# Patient Record
Sex: FEMALE | Race: WHITE | NOT HISPANIC OR LATINO
[De-identification: names, ages, dates, MRNs, and addresses within clinical notes are randomized per-mention and may not be internally consistent; named-entity substitution may affect disease eponyms.]

---

## 2017-06-14 ENCOUNTER — TRANSCRIPTION ENCOUNTER (OUTPATIENT)
Age: 57
End: 2017-06-14

## 2017-07-26 ENCOUNTER — APPOINTMENT (OUTPATIENT)
Dept: ORTHOPEDIC SURGERY | Facility: CLINIC | Age: 57
End: 2017-07-26

## 2017-07-26 VITALS
HEART RATE: 75 BPM | DIASTOLIC BLOOD PRESSURE: 79 MMHG | HEIGHT: 69 IN | WEIGHT: 175 LBS | SYSTOLIC BLOOD PRESSURE: 121 MMHG | BODY MASS INDEX: 25.92 KG/M2 | TEMPERATURE: 98.6 F

## 2017-08-01 ENCOUNTER — EMERGENCY (EMERGENCY)
Facility: HOSPITAL | Age: 57
LOS: 1 days | End: 2017-08-01
Payer: COMMERCIAL

## 2017-08-01 PROCEDURE — 99283 EMERGENCY DEPT VISIT LOW MDM: CPT

## 2017-08-31 ENCOUNTER — OUTPATIENT (OUTPATIENT)
Dept: OUTPATIENT SERVICES | Facility: HOSPITAL | Age: 57
LOS: 1 days | End: 2017-08-31
Payer: COMMERCIAL

## 2017-08-31 ENCOUNTER — APPOINTMENT (OUTPATIENT)
Dept: MAMMOGRAPHY | Facility: CLINIC | Age: 57
End: 2017-08-31
Payer: COMMERCIAL

## 2017-08-31 ENCOUNTER — APPOINTMENT (OUTPATIENT)
Dept: RADIOLOGY | Facility: CLINIC | Age: 57
End: 2017-08-31
Payer: COMMERCIAL

## 2017-08-31 DIAGNOSIS — Z00.00 ENCOUNTER FOR GENERAL ADULT MEDICAL EXAMINATION WITHOUT ABNORMAL FINDINGS: ICD-10-CM

## 2017-08-31 PROCEDURE — 77063 BREAST TOMOSYNTHESIS BI: CPT

## 2017-08-31 PROCEDURE — G0202: CPT | Mod: 26

## 2017-08-31 PROCEDURE — 77080 DXA BONE DENSITY AXIAL: CPT

## 2017-08-31 PROCEDURE — 77080 DXA BONE DENSITY AXIAL: CPT | Mod: 26

## 2017-08-31 PROCEDURE — 77067 SCR MAMMO BI INCL CAD: CPT

## 2017-08-31 PROCEDURE — 77063 BREAST TOMOSYNTHESIS BI: CPT | Mod: 26

## 2017-09-07 DIAGNOSIS — M85.89 OTHER SPECIFIED DISORDERS OF BONE DENSITY AND STRUCTURE, MULTIPLE SITES: ICD-10-CM

## 2017-09-07 DIAGNOSIS — Z12.31 ENCOUNTER FOR SCREENING MAMMOGRAM FOR MALIGNANT NEOPLASM OF BREAST: ICD-10-CM

## 2017-11-17 ENCOUNTER — APPOINTMENT (OUTPATIENT)
Dept: ULTRASOUND IMAGING | Facility: CLINIC | Age: 57
End: 2017-11-17

## 2018-06-27 ENCOUNTER — APPOINTMENT (OUTPATIENT)
Dept: ORTHOPEDIC SURGERY | Facility: CLINIC | Age: 58
End: 2018-06-27
Payer: COMMERCIAL

## 2018-06-27 PROCEDURE — 99214 OFFICE O/P EST MOD 30 MIN: CPT

## 2018-06-27 PROCEDURE — 73562 X-RAY EXAM OF KNEE 3: CPT | Mod: LT

## 2018-09-12 ENCOUNTER — APPOINTMENT (OUTPATIENT)
Dept: ORTHOPEDIC SURGERY | Facility: CLINIC | Age: 58
End: 2018-09-12
Payer: COMMERCIAL

## 2018-09-12 DIAGNOSIS — M17.12 UNILATERAL PRIMARY OSTEOARTHRITIS, LEFT KNEE: ICD-10-CM

## 2018-09-12 PROCEDURE — 99213 OFFICE O/P EST LOW 20 MIN: CPT

## 2018-12-03 ENCOUNTER — APPOINTMENT (OUTPATIENT)
Dept: ORTHOPEDIC SURGERY | Facility: HOSPITAL | Age: 58
End: 2018-12-03

## 2019-01-19 ENCOUNTER — TRANSCRIPTION ENCOUNTER (OUTPATIENT)
Age: 59
End: 2019-01-19

## 2019-02-20 ENCOUNTER — OTHER (OUTPATIENT)
Age: 59
End: 2019-02-20

## 2019-02-22 ENCOUNTER — RECORD ABSTRACTING (OUTPATIENT)
Age: 59
End: 2019-02-22

## 2019-02-27 ENCOUNTER — APPOINTMENT (OUTPATIENT)
Dept: CARDIOLOGY | Facility: CLINIC | Age: 59
End: 2019-02-27
Payer: COMMERCIAL

## 2019-02-27 ENCOUNTER — NON-APPOINTMENT (OUTPATIENT)
Age: 59
End: 2019-02-27

## 2019-02-27 VITALS
BODY MASS INDEX: 30.51 KG/M2 | DIASTOLIC BLOOD PRESSURE: 84 MMHG | HEART RATE: 72 BPM | OXYGEN SATURATION: 100 % | WEIGHT: 206 LBS | HEIGHT: 69 IN | SYSTOLIC BLOOD PRESSURE: 120 MMHG

## 2019-02-27 DIAGNOSIS — Z84.1 FAMILY HISTORY OF DISORDERS OF KIDNEY AND URETER: ICD-10-CM

## 2019-02-27 DIAGNOSIS — Z82.49 FAMILY HISTORY OF ISCHEMIC HEART DISEASE AND OTHER DISEASES OF THE CIRCULATORY SYSTEM: ICD-10-CM

## 2019-02-27 DIAGNOSIS — Z83.3 FAMILY HISTORY OF DIABETES MELLITUS: ICD-10-CM

## 2019-02-27 DIAGNOSIS — Z86.39 PERSONAL HISTORY OF OTHER ENDOCRINE, NUTRITIONAL AND METABOLIC DISEASE: ICD-10-CM

## 2019-02-27 PROCEDURE — 99244 OFF/OP CNSLTJ NEW/EST MOD 40: CPT

## 2019-02-27 PROCEDURE — 93000 ELECTROCARDIOGRAM COMPLETE: CPT

## 2019-02-27 RX ORDER — CELECOXIB 200 MG/1
200 CAPSULE ORAL
Qty: 60 | Refills: 0 | Status: DISCONTINUED | COMMUNITY
Start: 2018-09-12 | End: 2019-02-27

## 2019-02-27 RX ORDER — ACETAMINOPHEN AND CODEINE 300; 30 MG/1; MG/1
300-30 TABLET ORAL
Qty: 8 | Refills: 0 | Status: DISCONTINUED | COMMUNITY
Start: 2018-01-30 | End: 2019-02-27

## 2019-02-27 RX ORDER — AMOXICILLIN 500 MG/1
500 CAPSULE ORAL
Qty: 21 | Refills: 0 | Status: DISCONTINUED | COMMUNITY
Start: 2018-01-29 | End: 2019-02-27

## 2019-02-27 RX ORDER — AMOXICILLIN AND CLAVULANATE POTASSIUM 875; 125 MG/1; MG/1
875-125 TABLET, COATED ORAL
Qty: 20 | Refills: 0 | Status: DISCONTINUED | COMMUNITY
Start: 2018-01-04 | End: 2019-02-27

## 2019-02-27 RX ORDER — CHLORHEXIDINE GLUCONATE, 0.12% ORAL RINSE 1.2 MG/ML
0.12 SOLUTION DENTAL
Qty: 473 | Refills: 0 | Status: DISCONTINUED | COMMUNITY
Start: 2018-01-29 | End: 2019-02-27

## 2019-02-27 RX ORDER — DICLOFENAC SODIUM 75 MG/1
75 TABLET, DELAYED RELEASE ORAL
Qty: 30 | Refills: 0 | Status: DISCONTINUED | COMMUNITY
Start: 2018-06-27 | End: 2019-02-27

## 2019-02-27 RX ORDER — TOPIRAMATE 25 MG/1
25 CAPSULE, EXTENDED RELEASE ORAL
Qty: 30 | Refills: 0 | Status: DISCONTINUED | COMMUNITY
Start: 2018-01-31 | End: 2019-02-27

## 2019-02-27 RX ORDER — AMOXICILLIN 875 MG/1
875 TABLET, FILM COATED ORAL
Qty: 20 | Refills: 0 | Status: DISCONTINUED | COMMUNITY
Start: 2018-06-01 | End: 2019-02-27

## 2019-02-27 RX ORDER — TRAMADOL HYDROCHLORIDE 50 MG/1
50 TABLET, COATED ORAL 3 TIMES DAILY
Qty: 30 | Refills: 0 | Status: DISCONTINUED | COMMUNITY
Start: 2018-09-12 | End: 2019-02-27

## 2019-02-27 RX ORDER — TRAMADOL HYDROCHLORIDE 50 MG/1
50 TABLET, COATED ORAL
Qty: 60 | Refills: 0 | Status: DISCONTINUED | COMMUNITY
Start: 2018-10-08 | End: 2019-02-27

## 2019-02-27 NOTE — ASSESSMENT
[FreeTextEntry1] : EKG Elana February 27, 2019. Normal sinus rhythm. RS R. Dash. Infero-anterolateral ST-T wave changes nonspecific. Rightward axis.\par Labs her request

## 2019-02-27 NOTE — REASON FOR VISIT
[Consultation] : a consultation regarding [Chest Pain] : chest pain [Dyspnea] : dyspnea [Fatigue] : feeling tired (fatigue) [Palpitations] : palpitations

## 2019-02-27 NOTE — HISTORY OF PRESENT ILLNESS
[FreeTextEntry1] : 58-year-old is referred for consultation and presence of\par Bilateral ankle edema, treated with hydrochlorothiazide in association with exertional dyspnea, intermittent palpitations in the form of a red about her heart beats without any associated syncope, uneasy feeling in the chest without any radiation of pain, jaw pain, or diaphoresis.\par There is no significant PND, orthopnea.\par She is being treated for generalized inflammation.\par She has no history of hypertension, diabetes mellitus, myocardial infarction, cerebrovascular accident, periparotid disease\par Her CRP level was significantly elevated. I do not have lipid panel

## 2019-02-27 NOTE — DISCUSSION/SUMMARY
[FreeTextEntry1] : 58-year-old female comes in with intermittent ankle edema, dyspnea on exertion, nonexertional, chest pain, significantly elevated CRP level, abnormal EKG, and presence of treatment for generalized inflammation.\par Recommendation at present includes\par Obtain labs, which includes lipid panel.\par Echocardiogram for ejection fraction wall motion pericardial space and valvular evaluations, but\par Stress myocardial perfusion scan probably with lexiscan/pharmacologic in presence of significant arthritis and arthralgia, atypical chest pain, exertional shortness of breath, and abnormal EKG.\par \par Consider a sleep apnea evaluation.\par Further long-term risk stratification. Based on above tests and lipid panel\par \par Counseling regarding low saturated fat, salt and carbohydrate intake was reviewed. Active lifestyle and regular. Exercise along with weight management is advised.\par All the above were at length reviewed. Answered all the questions. Thank you very much for this kind referral. Please do not hesitate to give me a call for any question.\par Part of this transcription was done with voice recognition software and phonetically similar errors are common. I apologize for that. Please donot hesitate to call for any questions due to above.\par \par Follow up after above tests\par \par

## 2019-02-27 NOTE — REVIEW OF SYSTEMS
[Feeling Fatigued] : feeling fatigued [see HPI] : see HPI [Joint Pain] : joint pain [Joint Swelling] : joint swelling [Joint Stiffness] : joint stiffness [Negative] : Heme/Lymph [Fever] : no fever [Headache] : no headache [Recent Weight Gain (___ Lbs)] : no recent weight gain [Recent Weight Loss (___ Lbs)] : no recent weight loss [FreeTextEntry1] : snoring

## 2019-02-27 NOTE — PHYSICAL EXAM
[General Appearance - Well Developed] : well developed [Normal Appearance] : normal appearance [Well Groomed] : well groomed [General Appearance - Well Nourished] : well nourished [No Deformities] : no deformities [General Appearance - In No Acute Distress] : no acute distress [Normal Conjunctiva] : the conjunctiva exhibited no abnormalities [Eyelids - No Xanthelasma] : the eyelids demonstrated no xanthelasmas [Normal Oral Mucosa] : normal oral mucosa [No Oral Pallor] : no oral pallor [No Oral Cyanosis] : no oral cyanosis [Normal Jugular Venous A Waves Present] : normal jugular venous A waves present [Normal Jugular Venous V Waves Present] : normal jugular venous V waves present [No Jugular Venous Marin A Waves] : no jugular venous marin A waves [Normal] : normal [No Precordial Heave] : no precordial heave was noted [Normal Rate] : normal [Normal S1] : normal S1 [Normal S2] : normal S2 [II] : a grade 2 [2+] : left 2+ [No Abnormalities] : the abdominal aorta was not enlarged and no bruit was heard [No Pitting Edema] : no pitting edema present [Respiration, Rhythm And Depth] : normal respiratory rhythm and effort [Exaggerated Use Of Accessory Muscles For Inspiration] : no accessory muscle use [Auscultation Breath Sounds / Voice Sounds] : lungs were clear to auscultation bilaterally [Abdomen Soft] : soft [Abdomen Tenderness] : non-tender [Abdomen Mass (___ Cm)] : no abdominal mass palpated [Abnormal Walk] : normal gait [Gait - Sufficient For Exercise Testing] : the gait was sufficient for exercise testing [Nail Clubbing] : no clubbing of the fingernails [Cyanosis, Localized] : no localized cyanosis [Petechial Hemorrhages (___cm)] : no petechial hemorrhages [Skin Color & Pigmentation] : normal skin color and pigmentation [] : no rash [No Venous Stasis] : no venous stasis [Skin Lesions] : no skin lesions [No Skin Ulcers] : no skin ulcer [No Xanthoma] : no  xanthoma was observed [Oriented To Time, Place, And Person] : oriented to person, place, and time [Affect] : the affect was normal [Mood] : the mood was normal [No Anxiety] : not feeling anxious [S3] : no S3 [S4] : no S4 [Right Carotid Bruit] : no bruit heard over the right carotid [Left Carotid Bruit] : no bruit heard over the left carotid [Right Femoral Bruit] : no bruit heard over the right femoral artery [Left Femoral Bruit] : no bruit heard over the left femoral artery

## 2019-03-03 ENCOUNTER — FORM ENCOUNTER (OUTPATIENT)
Age: 59
End: 2019-03-03

## 2019-03-18 ENCOUNTER — APPOINTMENT (OUTPATIENT)
Dept: ORTHOPEDIC SURGERY | Facility: HOSPITAL | Age: 59
End: 2019-03-18

## 2019-03-25 ENCOUNTER — APPOINTMENT (OUTPATIENT)
Dept: CARDIOLOGY | Facility: CLINIC | Age: 59
End: 2019-03-25
Payer: COMMERCIAL

## 2019-03-25 PROCEDURE — 93015 CV STRESS TEST SUPVJ I&R: CPT

## 2019-03-25 PROCEDURE — A9502: CPT

## 2019-03-25 PROCEDURE — 93306 TTE W/DOPPLER COMPLETE: CPT

## 2019-03-25 PROCEDURE — 78452 HT MUSCLE IMAGE SPECT MULT: CPT

## 2019-03-26 ENCOUNTER — RECORD ABSTRACTING (OUTPATIENT)
Age: 59
End: 2019-03-26

## 2019-04-01 ENCOUNTER — APPOINTMENT (OUTPATIENT)
Dept: CARDIOLOGY | Facility: CLINIC | Age: 59
End: 2019-04-01
Payer: COMMERCIAL

## 2019-04-01 VITALS
SYSTOLIC BLOOD PRESSURE: 92 MMHG | BODY MASS INDEX: 28.88 KG/M2 | DIASTOLIC BLOOD PRESSURE: 68 MMHG | WEIGHT: 195 LBS | HEART RATE: 78 BPM | OXYGEN SATURATION: 99 % | HEIGHT: 69 IN

## 2019-04-01 PROCEDURE — 99214 OFFICE O/P EST MOD 30 MIN: CPT

## 2019-04-01 RX ORDER — HYDROCHLOROTHIAZIDE 25 MG/1
25 TABLET ORAL DAILY
Qty: 90 | Refills: 1 | Status: DISCONTINUED | COMMUNITY
End: 2019-04-01

## 2019-04-01 NOTE — ASSESSMENT
[FreeTextEntry1] : EKG Elana February 27, 2019. Normal sinus rhythm. . Infero-anterolateral ST-T wave changes nonspecific. Rightward axis.\par \par Reviewed on April 1, 2019\par Echocardiogram. Preserved ejection fraction. Possible PFO/ASD.\par Myocardial perfusion scan pharmacological. Nonischemic with preserved ejection fraction\par

## 2019-04-01 NOTE — REASON FOR VISIT
[Consultation] : a consultation regarding [Chest Pain] : chest pain [Dyspnea] : dyspnea [Fatigue] : feeling tired (fatigue) [Palpitations] : palpitations [FreeTextEntry1] : 58-year-old comes in for followup consultation due to review her echocardiogram, nuclear myocardial perfusion scan.\par Her edema has remained stable.\par She has increased fatigue with lower blood pressure.\par She has no chest pain, PND, orthopnea, palpitation, or syncopal episode.\par

## 2019-04-01 NOTE — DISCUSSION/SUMMARY
[FreeTextEntry1] : 58-year-old female comes in with intermittent ankle edema, dyspnea on exertion, nonexertional, chest pain, significantly elevated CRP level, abnormal EKG, and presence of treatment for generalized inflammation.\par Now with also lower blood pressure and increased fatigue on the hydrochlorothiazide.\par Medical his veins.\par Echocardiogram and myocardial perfusion scan showing preserved LV systolic function, nonischemic perfusion scan, possible PFO/ASD.\par Findings reviewed with her at length.\par Good prognostic information discussed.\par Limitation of the cardia vascular tests reviewed.\par A. persistent or worsening symptoms in future she may require further invasive evaluation and management.\par PFO reviewed with her. No history of neurological or cardioembolic events.\par Discuss with her regarding the association with cardioembolic event specifically neurological events with PFO.\par Discussed with her regarding edema in relation to varicose veins/venous insufficiency rather than cardiovascular etiology.\par Discuss with her regarding possible hydrochlorothiazide, making lower blood pressure, worse with increasing fatigue and tiredness.\par \par At present I recommended her to\par Lipid panel for risk stratification.\par Saline bubble study to evaluate for PFO/ASD. If evidence of PFO. Consider aspirin 81 mg. If no evidence of PFO/ASD. No need to pursue any further evaluation unless change in clinical status\par Low salt diet increase activity and compression stockings as needed.\par Discontinue hydrochlorothiazide for now.\par Consider vascular evaluation inflammation and referral given.\par Consider a sleep apnea evaluation as recommended into previous evaluation\par \par Counseling regarding low saturated fat, salt and carbohydrate intake was reviewed. Active lifestyle and regular. Exercise along with weight management is advised.\par All the above were at length reviewed. Answered all the questions. Thank you very much for this kind referral. Please do not hesitate to give me a call for any question.\par Part of this transcription was done with voice recognition software and phonetically similar errors are common. I apologize for that. Please donot hesitate to call for any questions due to above.\par \par

## 2019-04-01 NOTE — REVIEW OF SYSTEMS
[Fever] : no fever [Headache] : no headache [Recent Weight Gain (___ Lbs)] : no recent weight gain [Feeling Fatigued] : feeling fatigued [Recent Weight Loss (___ Lbs)] : no recent weight loss [see HPI] : see HPI [Joint Pain] : joint pain [Joint Swelling] : joint swelling [Joint Stiffness] : joint stiffness [Negative] : Heme/Lymph [FreeTextEntry1] : snoring

## 2019-04-01 NOTE — CARDIOLOGY SUMMARY
[No Ischemia] : no Ischemia [___] : [unfilled] [LVEF ___%] : LVEF [unfilled]% [None] : no pulmonary hypertension [Normal] : normal LA size [Mild] : mild mitral regurgitation

## 2019-04-01 NOTE — HISTORY OF PRESENT ILLNESS
[FreeTextEntry1] : \par She is being treated for generalized inflammation.\par She has no history of hypertension, diabetes mellitus, myocardial infarction, cerebrovascular accident, periparotid disease\par Her CRP level was significantly elevated. I do not have lipid panel

## 2019-04-01 NOTE — PHYSICAL EXAM
[General Appearance - Well Developed] : well developed [Normal Appearance] : normal appearance [Well Groomed] : well groomed [General Appearance - Well Nourished] : well nourished [No Deformities] : no deformities [General Appearance - In No Acute Distress] : no acute distress [Normal Conjunctiva] : the conjunctiva exhibited no abnormalities [Eyelids - No Xanthelasma] : the eyelids demonstrated no xanthelasmas [Normal Oral Mucosa] : normal oral mucosa [No Oral Pallor] : no oral pallor [No Oral Cyanosis] : no oral cyanosis [Normal Jugular Venous V Waves Present] : normal jugular venous V waves present [Respiration, Rhythm And Depth] : normal respiratory rhythm and effort [Exaggerated Use Of Accessory Muscles For Inspiration] : no accessory muscle use [Heart Rate And Rhythm] : heart rate and rhythm were normal [Heart Sounds] : normal S1 and S2 [Arterial Pulses Normal] : the arterial pulses were normal [FreeTextEntry1] : Systolic murmur at the base.\par Trace ankle edema, \par Varicose veins [Abdomen Soft] : soft [Abnormal Walk] : normal gait [Gait - Sufficient For Exercise Testing] : the gait was sufficient for exercise testing [Nail Clubbing] : no clubbing of the fingernails [Cyanosis, Localized] : no localized cyanosis [Skin Color & Pigmentation] : normal skin color and pigmentation [] : no rash [No Venous Stasis] : no venous stasis [Skin Lesions] : no skin lesions [No Skin Ulcers] : no skin ulcer [No Xanthoma] : no  xanthoma was observed [Oriented To Time, Place, And Person] : oriented to person, place, and time [Affect] : the affect was normal [Mood] : the mood was normal [No Anxiety] : not feeling anxious

## 2019-04-05 ENCOUNTER — APPOINTMENT (OUTPATIENT)
Dept: ORTHOPEDIC SURGERY | Facility: CLINIC | Age: 59
End: 2019-04-05
Payer: COMMERCIAL

## 2019-04-05 DIAGNOSIS — Z47.1 AFTERCARE FOLLOWING JOINT REPLACEMENT SURGERY: ICD-10-CM

## 2019-04-05 PROCEDURE — 20610 DRAIN/INJ JOINT/BURSA W/O US: CPT | Mod: LT

## 2019-04-05 PROCEDURE — 99213 OFFICE O/P EST LOW 20 MIN: CPT | Mod: 25

## 2019-04-05 NOTE — PROCEDURE
[de-identified] : The left knee was prepped with Betadine and under sterile condition the 40 mg Depo-Medrol and then 5 cc Lidocaine and 20 cc Marcaine injection was performed with a 21 gauge needle. The needle was introduced into the joint, aspiration was performed to ensure intra-articular placement and the medication was injected. Upon withdrawal of the needle the site was cleaned with alcohol and a band aid applied. The patient tolerated the injection well and there were no adverse effects. Post injection instructions included no strenuous activity for 24 hours, cryotherapy and if there are any adverse effects to contact the office.

## 2019-04-05 NOTE — PHYSICAL EXAM
[Normal] : Gait: normal [LE] : Sensory: Intact in bilateral lower extremities [ALL] : dorsalis pedis, posterior tibial, femoral, popliteal, and radial 2+ and symmetric bilaterally [Poor Appearance] : well-appearing [Acute Distress] : not in acute distress [Obese] : not obese [de-identified] : General appearance: Well nourished, well developed, pleasant, alert, and oriented x3.\par Respiratory: Breathing not labored, in no acute distress.\par HEENT: Normocephalic. EOM intact. Sclerae are clear.\par CV: No apparent abnormalities. No lower leg edema. No varicosities. Pedal pulses are palpable.\par Neurologic: Sensation is intact to light touch in the upper and lower extremities. No muscle weakness.\par Dermatologic: No apparent skin lesions or rash.\par Spine: C spine and L spine appear normal and move freely, normal and nontender.\par Upper Extremities: Hands, wrists, and elbows are normal and move freely. Shoulders are normal and move freely. All range of motion is symmetrical.\par Normal body habitus. Pulses are palpable.\par Review of systems, please see form for complete details. Medical data sheet was reviewed.\par \par Left knee, FROM hip, mild effusion, 0 - 95, large crepitus, no medial pain, no lateral pain, no Lachman, no pivot shift, no anterior or posterior drawer, stable, anatomic alignment, no infection, no blood clot\par Right knee, FROM hip, no effusion, 0 - 135 , no crepitus, no medial pain, no lateral pain, no Lachman, no pivot shift, no anterior or posterior drawer, stable, anatomic alignment \par

## 2019-04-05 NOTE — HISTORY OF PRESENT ILLNESS
[de-identified] : Patient is a 58 year old female who is s/p left partial knee replacement in 2013 who was scheduled for Left TKA in Oct however had to reschedule. patient states pain is increasing in severity.  positive posterior stabbing pain. positive swelling, buckling and clicking, no locking

## 2019-04-05 NOTE — ADDENDUM
[FreeTextEntry1] : I, Barron Luther, acted solely as a scribe for Dr. Willie Snow on 04/05/2019.\par \par All medical record entries made by the scribe were at my, Dr. Willie Snow, direction and personally dictated by me on 04/05/2019. I have reviewed the chart and agree that the record accurately reflects my personal performance of the history, physical exam, assessment and plan. I have also personally directed, reviewed, and agreed with the chart.

## 2019-04-05 NOTE — DISCUSSION/SUMMARY
[de-identified] : 57 year old female presents with left knee pain s/p partial arthroplasty, secondary to DJD. We talked about the nature of the condition and treatment options. Pt is in severe pain, which I believe is due to an arthritis flare up. She has essentially failed nonoperative treatment, and she will be scheduled for conversion to TKA with me at [facility]. \par \par For temporary pain relief, she elected to receive an injection to the left knee, which she tolerated well. Increased risk of infection from injection preop was discussed and pt accepts these risks. We talked about an injection. Discussed at length with the patient the planned steroid and lidocaine injection. The risks, benefits, convalescence and alternatives were reviewed. The possible side effects discussed included but were not limited to: pain, swelling, heat and redness. There symptoms are generally mild but if they are extensive then contact the office. Giving pain relievers by mouth such as NSAIDs or Tylenol can generally treat the reactions to steroid and lidocaine. Rare cases of infection have been noted. Rash, hives and itching may occur post injection. If you have muscle pain or cramps, flushing and or swelling of the face, rapid heartbeat, nausea, dizziness, fever, chills, headache, difficulty breathing, swelling in the arms or legs, or have a prickly feeling of your skin, contact a health care provider immediately. \par  \par \par A knee replacement means a resurfacing of all three surfaces, the patella, femur, and tibia, with metal and plastic parts. The prosthetic parts are usually cemented into position and will allow a patient a range of motion from full extension to about 120 degrees of flexion. The postoperative motion, however, is determined by multiple factors the most important of which is preoperative motion. In general, the better the motion preoperatively, the better the motion post operatively.\par The operation, pending medical clearance, generally requires a hospitalization of 5 to 7 days for one knee (10 - 14 days for a bilateral replacement). In general, we prefer to perform the procedure under epidural anesthesia. Basically, we would like patients to give at least 2 units of blood per knee for an operative procedure, which takes approximately 2 hours. The operation requires a straight incision anywhere from 6 to 9 inches down the front of the knee. Post operatively the patient is positioned on a continuous passive motion machine within the first 24 hours, and walking the day after surgery. The first couple of days are very painful and the pain medication will alleviate but not eliminate the pain. Thus the patient must really push hard to get the range of motion. Our goal for having the person go home is that the range of motion is approximately 90 degrees, and they can walk with a cane. The cane is to be dispensed with once the patient is secure enough. In general, there is no cast or braces required with a routine knee replacement.\par In the long term, we do not encourage our patients to run for the sake of running; although, pending their preoperative status, we often allow patients to play doubles tennis or comparable activities. We also allow them to do gentle intermediate downhill skiing if they are truly an expert skier. Biking is encouraged as well as swimming. The follow up periods are usually at 3 weeks, 6 weeks, 6 months, and yearly intervals.\par Potential complications with total knee replacements include infection (less than 1%), nerve damage, by which we mean a peroneal nerve palsy, a foot drop, (a flapping foot with ambulation). This is particularly more apt to occur with a bad valgus (knock knee) deformity. The incidence can be quite high in this particular patient population. There are always areas of skin numbness but this is not an untoward effect nor do we consider it a complication.\par Other potential complications include dislocation of the patella component (less than 2%). The loosening of either the tibial or femoral component is much more infrequent. It usually occurs with infection or long-term use in a patient population that is at extreme risk (e.g. markedly overweight and not conscientious about their exercises). Major blood vessel damage is also extremely rare. Theoretically because of the anatomic proximity of the popliteal artery, it could be lacerated with subsequent repairs required. Albeit unlikely, a disruption of the popliteal artery could theoretically result in an amputation.\par Similarly, infection could theoretically result in an amputation if one were to grow out an organism that cannot be controlled with antibiotics.\par General medical complications include phlebitis for which we prophylactically anticoagulated but it could still occur and fatal pulmonary embolism has been reported. Cardiovascular problems, such as a heart attack or ischemia are always a concern with such hemodynamic changes in the blood vascular system. Other general complications are very rare but anything in medicine could theoretically happen. I think the patient understands the risk benefit ratio of total knee replacement and will think about whether they would like to pursue an operative or nonoperative option.

## 2019-04-05 NOTE — REASON FOR VISIT
[Follow-Up Visit] : a follow-up visit for [Artificial Knee Joint] : artificial knee joint [Knee Pain] : knee pain

## 2019-05-08 ENCOUNTER — RESULT REVIEW (OUTPATIENT)
Age: 59
End: 2019-05-08

## 2019-05-08 ENCOUNTER — APPOINTMENT (OUTPATIENT)
Dept: CARDIOLOGY | Facility: CLINIC | Age: 59
End: 2019-05-08
Payer: COMMERCIAL

## 2019-05-08 PROCEDURE — 96374 THER/PROPH/DIAG INJ IV PUSH: CPT | Mod: 59

## 2019-05-08 PROCEDURE — 93308 TTE F-UP OR LMTD: CPT

## 2019-05-13 ENCOUNTER — APPOINTMENT (OUTPATIENT)
Dept: CARDIOLOGY | Facility: CLINIC | Age: 59
End: 2019-05-13

## 2019-06-17 ENCOUNTER — APPOINTMENT (OUTPATIENT)
Dept: CARDIOLOGY | Facility: CLINIC | Age: 59
End: 2019-06-17
Payer: COMMERCIAL

## 2019-06-17 ENCOUNTER — NON-APPOINTMENT (OUTPATIENT)
Age: 59
End: 2019-06-17

## 2019-06-17 VITALS
DIASTOLIC BLOOD PRESSURE: 94 MMHG | OXYGEN SATURATION: 100 % | HEIGHT: 68 IN | WEIGHT: 185 LBS | HEART RATE: 70 BPM | BODY MASS INDEX: 28.04 KG/M2 | SYSTOLIC BLOOD PRESSURE: 137 MMHG

## 2019-06-17 PROCEDURE — 99202 OFFICE O/P NEW SF 15 MIN: CPT

## 2019-06-17 PROCEDURE — 93000 ELECTROCARDIOGRAM COMPLETE: CPT

## 2019-06-17 RX ORDER — ASCORBIC ACID 500 MG
500 TABLET ORAL
Refills: 0 | Status: DISCONTINUED | COMMUNITY
End: 2019-06-17

## 2019-06-17 RX ORDER — CYANOCOBALAMIN (VITAMIN B-12) 1000 MCG
TABLET ORAL
Refills: 0 | Status: DISCONTINUED | COMMUNITY
End: 2019-06-17

## 2019-06-17 RX ORDER — CHOLECALCIFEROL (VITAMIN D3) 50 MCG
2000 CAPSULE ORAL
Refills: 0 | Status: DISCONTINUED | COMMUNITY
End: 2019-06-17

## 2019-06-17 NOTE — PHYSICAL EXAM
[Normal Appearance] : normal appearance [General Appearance - Well Developed] : well developed [General Appearance - Well Nourished] : well nourished [Well Groomed] : well groomed [No Deformities] : no deformities [General Appearance - In No Acute Distress] : no acute distress [Normal Conjunctiva] : the conjunctiva exhibited no abnormalities [Eyelids - No Xanthelasma] : the eyelids demonstrated no xanthelasmas [Normal Oral Mucosa] : normal oral mucosa [No Oral Pallor] : no oral pallor [No Oral Cyanosis] : no oral cyanosis [Normal Jugular Venous A Waves Present] : normal jugular venous A waves present [Normal Jugular Venous V Waves Present] : normal jugular venous V waves present [No Jugular Venous Marin A Waves] : no jugular venous marin A waves [Heart Rate And Rhythm] : heart rate and rhythm were normal [Murmurs] : no murmurs present [Heart Sounds] : normal S1 and S2 [Respiration, Rhythm And Depth] : normal respiratory rhythm and effort [Exaggerated Use Of Accessory Muscles For Inspiration] : no accessory muscle use [Auscultation Breath Sounds / Voice Sounds] : lungs were clear to auscultation bilaterally [Abdomen Tenderness] : non-tender [Abdomen Soft] : soft [Abdomen Mass (___ Cm)] : no abdominal mass palpated [Gait - Sufficient For Exercise Testing] : the gait was sufficient for exercise testing [Abnormal Walk] : normal gait [Nail Clubbing] : no clubbing of the fingernails [Cyanosis, Localized] : no localized cyanosis [Petechial Hemorrhages (___cm)] : no petechial hemorrhages [No Venous Stasis] : no venous stasis [Skin Color & Pigmentation] : normal skin color and pigmentation [] : no rash [No Xanthoma] : no  xanthoma was observed [Skin Lesions] : no skin lesions [No Skin Ulcers] : no skin ulcer [Affect] : the affect was normal [Oriented To Time, Place, And Person] : oriented to person, place, and time [Mood] : the mood was normal [No Anxiety] : not feeling anxious

## 2019-06-17 NOTE — DISCUSSION/SUMMARY
[FreeTextEntry1] : Patient will need ROD for more definitive assessment of congenital heart disease anatomy and risk of paradoxical embolization prior to clearance for TKR.

## 2019-06-17 NOTE — HISTORY OF PRESENT ILLNESS
[FreeTextEntry1] : 58 year old woman undergoing workup for cardiac clearance and atypical chest pain.  TTE suggests a large intracardiac right to left shunt.Patient will be needing a TKR.

## 2019-06-19 ENCOUNTER — CLINICAL ADVICE (OUTPATIENT)
Age: 59
End: 2019-06-19

## 2019-07-01 ENCOUNTER — APPOINTMENT (OUTPATIENT)
Dept: CARDIOLOGY | Facility: CLINIC | Age: 59
End: 2019-07-01

## 2019-07-09 ENCOUNTER — APPOINTMENT (OUTPATIENT)
Dept: CARDIOLOGY | Facility: CLINIC | Age: 59
End: 2019-07-09
Payer: COMMERCIAL

## 2019-07-09 VITALS
HEIGHT: 68 IN | WEIGHT: 186 LBS | HEART RATE: 94 BPM | DIASTOLIC BLOOD PRESSURE: 79 MMHG | BODY MASS INDEX: 28.19 KG/M2 | SYSTOLIC BLOOD PRESSURE: 115 MMHG | OXYGEN SATURATION: 95 %

## 2019-07-09 PROCEDURE — 99214 OFFICE O/P EST MOD 30 MIN: CPT

## 2019-07-11 NOTE — HISTORY OF PRESENT ILLNESS
[FreeTextEntry1] : Patient presenting with dyspnea and on workup evidence of a right to left intracardiac shunt was noted. ROD was recently done which confirmed an interatrial shunt with significant right to left flow on bubble study.

## 2019-07-11 NOTE — PHYSICAL EXAM
[General Appearance - Well Developed] : well developed [Normal Appearance] : normal appearance [Well Groomed] : well groomed [General Appearance - Well Nourished] : well nourished [No Deformities] : no deformities [General Appearance - In No Acute Distress] : no acute distress [Normal Conjunctiva] : the conjunctiva exhibited no abnormalities [Eyelids - No Xanthelasma] : the eyelids demonstrated no xanthelasmas [Normal Oral Mucosa] : normal oral mucosa [No Oral Pallor] : no oral pallor [No Oral Cyanosis] : no oral cyanosis [Normal Jugular Venous A Waves Present] : normal jugular venous A waves present [Normal Jugular Venous V Waves Present] : normal jugular venous V waves present [No Jugular Venous Marin A Waves] : no jugular venous marin A waves [Respiration, Rhythm And Depth] : normal respiratory rhythm and effort [Exaggerated Use Of Accessory Muscles For Inspiration] : no accessory muscle use [Auscultation Breath Sounds / Voice Sounds] : lungs were clear to auscultation bilaterally [Heart Sounds] : normal S1 and S2 [Heart Rate And Rhythm] : heart rate and rhythm were normal [Murmurs] : no murmurs present [Abdomen Soft] : soft [Abdomen Tenderness] : non-tender [Abdomen Mass (___ Cm)] : no abdominal mass palpated [Abnormal Walk] : normal gait [Gait - Sufficient For Exercise Testing] : the gait was sufficient for exercise testing [Nail Clubbing] : no clubbing of the fingernails [Cyanosis, Localized] : no localized cyanosis [Petechial Hemorrhages (___cm)] : no petechial hemorrhages [Skin Color & Pigmentation] : normal skin color and pigmentation [] : no rash [No Venous Stasis] : no venous stasis [Skin Lesions] : no skin lesions [No Skin Ulcers] : no skin ulcer [No Xanthoma] : no  xanthoma was observed [Oriented To Time, Place, And Person] : oriented to person, place, and time [Affect] : the affect was normal [Mood] : the mood was normal [No Anxiety] : not feeling anxious

## 2019-07-11 NOTE — DISCUSSION/SUMMARY
[FreeTextEntry1] : ROD was reviewed.  This does document an intracardiac shunt at the atrial septum.  On bubble injection too numerous to count bubbles are seen to cross right to left.  On the basis of the ROD I have recommended percutaneous closure.

## 2019-07-11 NOTE — REASON FOR VISIT
[Follow-Up - Clinic] : a clinic follow-up of [Chest Pain] : chest pain [Dyspnea] : dyspnea [FreeTextEntry1] : intracardiaac shunt

## 2019-07-22 ENCOUNTER — APPOINTMENT (OUTPATIENT)
Dept: CARDIOLOGY | Facility: CLINIC | Age: 59
End: 2019-07-22
Payer: COMMERCIAL

## 2019-07-22 VITALS
BODY MASS INDEX: 31.83 KG/M2 | WEIGHT: 210 LBS | HEIGHT: 68 IN | SYSTOLIC BLOOD PRESSURE: 120 MMHG | DIASTOLIC BLOOD PRESSURE: 80 MMHG | OXYGEN SATURATION: 98 % | HEART RATE: 80 BPM

## 2019-07-22 DIAGNOSIS — Z87.898 PERSONAL HISTORY OF OTHER SPECIFIED CONDITIONS: ICD-10-CM

## 2019-07-22 DIAGNOSIS — I95.2 HYPOTENSION DUE TO DRUGS: ICD-10-CM

## 2019-07-22 PROCEDURE — 99214 OFFICE O/P EST MOD 30 MIN: CPT

## 2019-07-22 NOTE — REASON FOR VISIT
[Follow-Up - Clinic] : a clinic follow-up of [Chest Pain] : chest pain [Dyspnea] : dyspnea [Fatigue] : feeling tired (fatigue) [Palpitations] : palpitations [FreeTextEntry1] : 58-year-old female comes in for followup consultation today view after transesophageal echocardiogram and evaluation by interventional cardiologist, along with followup on lipid panel.\par Her edema has improved\par She has continued fatigue, but her blood pressure is stable.\par She has no chest pain. No PND or orthopnea.\par She has no palpitation, dizziness, syncopal episode.\par Her activity level is limited because of significant osteoarthritis.\par \par \par

## 2019-07-22 NOTE — DISCUSSION/SUMMARY
[FreeTextEntry1] : 58-year-old female comes in with intermittent ankle edema, dyspnea on exertion, nonexertional, chest pain, significantly elevated CRP level, abnormal EKG, and presence of treatment for generalized inflammation.\par Echocardiogram and myocardial perfusion scan showing preserved LV systolic function, nonischemic perfusion scan, possible PFO/ASD.\par Transesophageal echocardiogram, showing significant intra-atrial shunting suggestive of ASD\par Now planned to have percutaneous closure.\par At the edema has resolved.\par Her lipid panel reviewed with ten-year ASCVD, risk\par Findings reviewed with her at length.\par \par Discussed with her regarding edema in relation to varicose veins/venous insufficiency rather than cardiovascular etiology.\par \par \par At present I recommended her to\par Follow with  for ASD closure in presence of significant shunt at atrial level. Again, review the pathophysiology. Discussed briefly, the procedure. Further discussion regarding management as per Dr. Lazo\par Vascular evaluation for very close veins if any significant worsening of the edema is less symptoms.\par The patient remained stable. She will stay off hydrochlorothiazide.\par Osteoarthritis management\par Consider a sleep apnea evaluation as recommended into previous evaluation\par \par Counseling regarding low saturated fat, salt and carbohydrate intake was reviewed. Active lifestyle and regular. Exercise along with weight management is advised.\par All the above were at length reviewed. Answered all the questions. Thank you very much for this kind referral. Please do not hesitate to give me a call for any question.\par Part of this transcription was done with voice recognition software and phonetically similar errors are common. I apologize for that. Please donot hesitate to call for any questions due to above.\par \par

## 2019-07-22 NOTE — REVIEW OF SYSTEMS
[Feeling Fatigued] : feeling fatigued [Joint Pain] : joint pain [Joint Stiffness] : joint stiffness [see HPI] : see HPI [Joint Swelling] : joint swelling [Negative] : Psychiatric [Fever] : no fever [Headache] : no headache [Recent Weight Gain (___ Lbs)] : no recent weight gain [FreeTextEntry1] : snoring [Recent Weight Loss (___ Lbs)] : no recent weight loss

## 2019-07-22 NOTE — HISTORY OF PRESENT ILLNESS
[FreeTextEntry1] : \par She is being treated for generalized inflammation.\par She has no history of hypertension, diabetes mellitus, myocardial infarction, cerebrovascular accident, periparotid disease\par Her CRP level was significantly elevated. I do not have lipid panel\par Large  ASD

## 2019-07-22 NOTE — CARDIOLOGY SUMMARY
[___] : [unfilled] [No Ischemia] : no Ischemia [LVEF ___%] : LVEF [unfilled]% [None] : normal LV function [Mild] : mild mitral regurgitation [Normal] : normal LA size

## 2019-07-22 NOTE — PHYSICAL EXAM
[Normal Appearance] : normal appearance [General Appearance - Well Developed] : well developed [General Appearance - Well Nourished] : well nourished [Well Groomed] : well groomed [No Deformities] : no deformities [General Appearance - In No Acute Distress] : no acute distress [Normal Conjunctiva] : the conjunctiva exhibited no abnormalities [Eyelids - No Xanthelasma] : the eyelids demonstrated no xanthelasmas [Normal Oral Mucosa] : normal oral mucosa [No Oral Pallor] : no oral pallor [No Oral Cyanosis] : no oral cyanosis [Respiration, Rhythm And Depth] : normal respiratory rhythm and effort [Normal Jugular Venous V Waves Present] : normal jugular venous V waves present [Exaggerated Use Of Accessory Muscles For Inspiration] : no accessory muscle use [Heart Rate And Rhythm] : heart rate and rhythm were normal [Arterial Pulses Normal] : the arterial pulses were normal [Heart Sounds] : normal S1 and S2 [Abnormal Walk] : normal gait [Abdomen Soft] : soft [Gait - Sufficient For Exercise Testing] : the gait was sufficient for exercise testing [Nail Clubbing] : no clubbing of the fingernails [Cyanosis, Localized] : no localized cyanosis [] : no rash [Skin Color & Pigmentation] : normal skin color and pigmentation [No Venous Stasis] : no venous stasis [No Skin Ulcers] : no skin ulcer [Skin Lesions] : no skin lesions [No Xanthoma] : no  xanthoma was observed [Oriented To Time, Place, And Person] : oriented to person, place, and time [Affect] : the affect was normal [Mood] : the mood was normal [No Anxiety] : not feeling anxious [FreeTextEntry1] : Systolic murmur at the base. No gallop, or rub.\par Trace ankle edema, \par Varicose veins

## 2019-07-30 ENCOUNTER — MOBILE ON CALL (OUTPATIENT)
Age: 59
End: 2019-07-30

## 2019-07-30 ENCOUNTER — CLINICAL ADVICE (OUTPATIENT)
Age: 59
End: 2019-07-30

## 2019-08-08 ENCOUNTER — APPOINTMENT (OUTPATIENT)
Dept: CARDIOLOGY | Facility: CLINIC | Age: 59
End: 2019-08-08
Payer: COMMERCIAL

## 2019-08-08 PROCEDURE — 93015 CV STRESS TEST SUPVJ I&R: CPT

## 2019-08-16 ENCOUNTER — APPOINTMENT (OUTPATIENT)
Dept: CARDIOLOGY | Facility: CLINIC | Age: 59
End: 2019-08-16

## 2019-09-06 ENCOUNTER — APPOINTMENT (OUTPATIENT)
Dept: CARDIOLOGY | Facility: CLINIC | Age: 59
End: 2019-09-06

## 2019-09-10 ENCOUNTER — OUTPATIENT (OUTPATIENT)
Dept: OUTPATIENT SERVICES | Facility: HOSPITAL | Age: 59
LOS: 1 days | End: 2019-09-10
Payer: COMMERCIAL

## 2019-09-10 VITALS
TEMPERATURE: 98 F | DIASTOLIC BLOOD PRESSURE: 87 MMHG | HEART RATE: 70 BPM | WEIGHT: 218.04 LBS | RESPIRATION RATE: 18 BRPM | OXYGEN SATURATION: 99 % | SYSTOLIC BLOOD PRESSURE: 139 MMHG | HEIGHT: 69 IN

## 2019-09-10 DIAGNOSIS — Z01.810 ENCOUNTER FOR PREPROCEDURAL CARDIOVASCULAR EXAMINATION: ICD-10-CM

## 2019-09-10 DIAGNOSIS — Z98.890 OTHER SPECIFIED POSTPROCEDURAL STATES: Chronic | ICD-10-CM

## 2019-09-10 DIAGNOSIS — Q21.1 ATRIAL SEPTAL DEFECT: ICD-10-CM

## 2019-09-10 LAB
ANION GAP SERPL CALC-SCNC: 10 MMOL/L — SIGNIFICANT CHANGE UP (ref 5–17)
APTT BLD: 27.7 SEC — SIGNIFICANT CHANGE UP (ref 27.5–36.3)
BASOPHILS # BLD AUTO: 0.05 K/UL — SIGNIFICANT CHANGE UP (ref 0–0.2)
BASOPHILS NFR BLD AUTO: 0.8 % — SIGNIFICANT CHANGE UP (ref 0–2)
BLD GP AB SCN SERPL QL: SIGNIFICANT CHANGE UP
BUN SERPL-MCNC: 18 MG/DL — SIGNIFICANT CHANGE UP (ref 8–20)
CALCIUM SERPL-MCNC: 9.5 MG/DL — SIGNIFICANT CHANGE UP (ref 8.6–10.2)
CHLORIDE SERPL-SCNC: 101 MMOL/L — SIGNIFICANT CHANGE UP (ref 98–107)
CO2 SERPL-SCNC: 27 MMOL/L — SIGNIFICANT CHANGE UP (ref 22–29)
CREAT SERPL-MCNC: 1 MG/DL — SIGNIFICANT CHANGE UP (ref 0.5–1.3)
EOSINOPHIL # BLD AUTO: 0.27 K/UL — SIGNIFICANT CHANGE UP (ref 0–0.5)
EOSINOPHIL NFR BLD AUTO: 4.2 % — SIGNIFICANT CHANGE UP (ref 0–6)
GLUCOSE SERPL-MCNC: 92 MG/DL — SIGNIFICANT CHANGE UP (ref 70–115)
HCT VFR BLD CALC: 35.8 % — SIGNIFICANT CHANGE UP (ref 34.5–45)
HGB BLD-MCNC: 11.4 G/DL — LOW (ref 11.5–15.5)
IMM GRANULOCYTES NFR BLD AUTO: 0.6 % — SIGNIFICANT CHANGE UP (ref 0–1.5)
INR BLD: 1.13 RATIO — SIGNIFICANT CHANGE UP (ref 0.88–1.16)
LYMPHOCYTES # BLD AUTO: 1.56 K/UL — SIGNIFICANT CHANGE UP (ref 1–3.3)
LYMPHOCYTES # BLD AUTO: 24.5 % — SIGNIFICANT CHANGE UP (ref 13–44)
MAGNESIUM SERPL-MCNC: 2.1 MG/DL — SIGNIFICANT CHANGE UP (ref 1.6–2.6)
MCHC RBC-ENTMCNC: 29.6 PG — SIGNIFICANT CHANGE UP (ref 27–34)
MCHC RBC-ENTMCNC: 31.8 GM/DL — LOW (ref 32–36)
MCV RBC AUTO: 93 FL — SIGNIFICANT CHANGE UP (ref 80–100)
MONOCYTES # BLD AUTO: 0.56 K/UL — SIGNIFICANT CHANGE UP (ref 0–0.9)
MONOCYTES NFR BLD AUTO: 8.8 % — SIGNIFICANT CHANGE UP (ref 2–14)
NEUTROPHILS # BLD AUTO: 3.88 K/UL — SIGNIFICANT CHANGE UP (ref 1.8–7.4)
NEUTROPHILS NFR BLD AUTO: 61.1 % — SIGNIFICANT CHANGE UP (ref 43–77)
PLATELET # BLD AUTO: 354 K/UL — SIGNIFICANT CHANGE UP (ref 150–400)
POTASSIUM SERPL-MCNC: 4.4 MMOL/L — SIGNIFICANT CHANGE UP (ref 3.5–5.3)
POTASSIUM SERPL-SCNC: 4.4 MMOL/L — SIGNIFICANT CHANGE UP (ref 3.5–5.3)
PROTHROM AB SERPL-ACNC: 13.1 SEC — HIGH (ref 10–12.9)
RBC # BLD: 3.85 M/UL — SIGNIFICANT CHANGE UP (ref 3.8–5.2)
RBC # FLD: 13.6 % — SIGNIFICANT CHANGE UP (ref 10.3–14.5)
SODIUM SERPL-SCNC: 138 MMOL/L — SIGNIFICANT CHANGE UP (ref 135–145)
WBC # BLD: 6.36 K/UL — SIGNIFICANT CHANGE UP (ref 3.8–10.5)
WBC # FLD AUTO: 6.36 K/UL — SIGNIFICANT CHANGE UP (ref 3.8–10.5)

## 2019-09-10 PROCEDURE — 85730 THROMBOPLASTIN TIME PARTIAL: CPT

## 2019-09-10 PROCEDURE — G0463: CPT

## 2019-09-10 PROCEDURE — 85610 PROTHROMBIN TIME: CPT

## 2019-09-10 PROCEDURE — 83735 ASSAY OF MAGNESIUM: CPT

## 2019-09-10 PROCEDURE — 86900 BLOOD TYPING SEROLOGIC ABO: CPT

## 2019-09-10 PROCEDURE — 93010 ELECTROCARDIOGRAM REPORT: CPT

## 2019-09-10 PROCEDURE — 86901 BLOOD TYPING SEROLOGIC RH(D): CPT

## 2019-09-10 PROCEDURE — 36415 COLL VENOUS BLD VENIPUNCTURE: CPT

## 2019-09-10 PROCEDURE — 86850 RBC ANTIBODY SCREEN: CPT

## 2019-09-10 PROCEDURE — 85027 COMPLETE CBC AUTOMATED: CPT

## 2019-09-10 PROCEDURE — 80048 BASIC METABOLIC PNL TOTAL CA: CPT

## 2019-09-10 PROCEDURE — 93005 ELECTROCARDIOGRAM TRACING: CPT

## 2019-09-10 NOTE — H&P PST ADULT - NSICDXPASTMEDICALHX_GEN_ALL_CORE_FT
PAST MEDICAL HISTORY:  ASD (atrial septal defect)     H/O [place] spotted fever     Hypothyroidism     Left knee pain need replacement

## 2019-09-10 NOTE — ASU PATIENT PROFILE, ADULT - PMH
ASD (atrial septal defect)    H/O [place] spotted fever    Hypothyroidism    Left knee pain  need replacement

## 2019-09-10 NOTE — H&P PST ADULT - ASSESSMENT
58 year old female with known ASD for closure. 58 year old female with known ASD for closure.    Bleeding risk: 2.0%

## 2019-09-10 NOTE — H&P PST ADULT - HISTORY OF PRESENT ILLNESS
58 year old with c/o dyspnea found to have a right to left intracardiac shunt at the atrium septum.  For ASD closure    Allergy to shellfish but has tolerated IV dye

## 2019-09-18 ENCOUNTER — FORM ENCOUNTER (OUTPATIENT)
Age: 59
End: 2019-09-18

## 2019-09-19 ENCOUNTER — INPATIENT (INPATIENT)
Facility: HOSPITAL | Age: 59
LOS: 0 days | Discharge: HOME | DRG: 274 | End: 2019-09-20
Attending: INTERNAL MEDICINE | Admitting: INTERNAL MEDICINE
Payer: COMMERCIAL

## 2019-09-19 ENCOUNTER — TRANSCRIPTION ENCOUNTER (OUTPATIENT)
Age: 59
End: 2019-09-19

## 2019-09-19 VITALS
SYSTOLIC BLOOD PRESSURE: 123 MMHG | DIASTOLIC BLOOD PRESSURE: 73 MMHG | TEMPERATURE: 98 F | RESPIRATION RATE: 18 BRPM | HEART RATE: 71 BPM | OXYGEN SATURATION: 99 %

## 2019-09-19 DIAGNOSIS — Q21.1 ATRIAL SEPTAL DEFECT: ICD-10-CM

## 2019-09-19 DIAGNOSIS — Z98.890 OTHER SPECIFIED POSTPROCEDURAL STATES: Chronic | ICD-10-CM

## 2019-09-19 LAB — BLD GP AB SCN SERPL QL: SIGNIFICANT CHANGE UP

## 2019-09-19 PROCEDURE — 93010 ELECTROCARDIOGRAM REPORT: CPT | Mod: 76

## 2019-09-19 RX ORDER — OMEPRAZOLE 10 MG/1
1 CAPSULE, DELAYED RELEASE ORAL
Qty: 0 | Refills: 0 | DISCHARGE

## 2019-09-19 RX ORDER — ALPRAZOLAM 0.25 MG
0.25 TABLET ORAL EVERY 6 HOURS
Refills: 0 | Status: DISCONTINUED | OUTPATIENT
Start: 2019-09-19 | End: 2019-09-20

## 2019-09-19 RX ORDER — LINACLOTIDE 145 UG/1
1 CAPSULE, GELATIN COATED ORAL
Qty: 0 | Refills: 0 | DISCHARGE

## 2019-09-19 RX ORDER — ASPIRIN/CALCIUM CARB/MAGNESIUM 324 MG
81 TABLET ORAL DAILY
Refills: 0 | Status: DISCONTINUED | OUTPATIENT
Start: 2019-09-20 | End: 2019-09-20

## 2019-09-19 RX ORDER — ONDANSETRON 8 MG/1
4 TABLET, FILM COATED ORAL EVERY 8 HOURS
Refills: 0 | Status: DISCONTINUED | OUTPATIENT
Start: 2019-09-19 | End: 2019-09-20

## 2019-09-19 RX ORDER — ASPIRIN/CALCIUM CARB/MAGNESIUM 324 MG
1 TABLET ORAL
Qty: 0 | Refills: 0 | DISCHARGE

## 2019-09-19 RX ORDER — PANTOPRAZOLE SODIUM 20 MG/1
40 TABLET, DELAYED RELEASE ORAL
Refills: 0 | Status: DISCONTINUED | OUTPATIENT
Start: 2019-09-19 | End: 2019-09-20

## 2019-09-19 RX ORDER — DICLOFENAC SODIUM 75 MG/1
1 TABLET, DELAYED RELEASE ORAL
Qty: 0 | Refills: 0 | DISCHARGE

## 2019-09-19 RX ORDER — ACETAMINOPHEN 500 MG
650 TABLET ORAL EVERY 6 HOURS
Refills: 0 | Status: DISCONTINUED | OUTPATIENT
Start: 2019-09-19 | End: 2019-09-20

## 2019-09-19 RX ORDER — FENTANYL CITRATE 50 UG/ML
25 INJECTION INTRAVENOUS ONCE
Refills: 0 | Status: DISCONTINUED | OUTPATIENT
Start: 2019-09-19 | End: 2019-09-19

## 2019-09-19 RX ORDER — SODIUM CHLORIDE 9 MG/ML
350 INJECTION INTRAMUSCULAR; INTRAVENOUS; SUBCUTANEOUS
Refills: 0 | Status: DISCONTINUED | OUTPATIENT
Start: 2019-09-19 | End: 2019-09-20

## 2019-09-19 RX ORDER — ALPRAZOLAM 0.25 MG
0.25 TABLET ORAL EVERY 6 HOURS
Refills: 0 | Status: DISCONTINUED | OUTPATIENT
Start: 2019-09-19 | End: 2019-09-19

## 2019-09-19 RX ORDER — SODIUM CHLORIDE 9 MG/ML
1000 INJECTION INTRAMUSCULAR; INTRAVENOUS; SUBCUTANEOUS
Refills: 0 | Status: DISCONTINUED | OUTPATIENT
Start: 2019-09-19 | End: 2019-09-19

## 2019-09-19 RX ORDER — CLOPIDOGREL BISULFATE 75 MG/1
75 TABLET, FILM COATED ORAL DAILY
Refills: 0 | Status: DISCONTINUED | OUTPATIENT
Start: 2019-09-19 | End: 2019-09-20

## 2019-09-19 RX ORDER — ACETAMINOPHEN 500 MG
1000 TABLET ORAL ONCE
Refills: 0 | Status: COMPLETED | OUTPATIENT
Start: 2019-09-19 | End: 2019-09-19

## 2019-09-19 RX ADMIN — Medication 1000 MILLIGRAM(S): at 12:11

## 2019-09-19 RX ADMIN — SODIUM CHLORIDE 50 MILLILITER(S): 9 INJECTION INTRAMUSCULAR; INTRAVENOUS; SUBCUTANEOUS at 12:11

## 2019-09-19 RX ADMIN — Medication 0.25 MILLIGRAM(S): at 21:54

## 2019-09-19 RX ADMIN — SODIUM CHLORIDE 50 MILLILITER(S): 9 INJECTION INTRAMUSCULAR; INTRAVENOUS; SUBCUTANEOUS at 08:32

## 2019-09-19 RX ADMIN — CLOPIDOGREL BISULFATE 75 MILLIGRAM(S): 75 TABLET, FILM COATED ORAL at 16:22

## 2019-09-19 RX ADMIN — FENTANYL CITRATE 25 MICROGRAM(S): 50 INJECTION INTRAVENOUS at 16:34

## 2019-09-19 RX ADMIN — Medication 400 MILLIGRAM(S): at 10:47

## 2019-09-19 RX ADMIN — FENTANYL CITRATE 25 MICROGRAM(S): 50 INJECTION INTRAVENOUS at 16:47

## 2019-09-19 RX ADMIN — Medication 30 MILLILITER(S): at 15:37

## 2019-09-19 NOTE — DISCHARGE NOTE PROVIDER - CARE PROVIDER_API CALL
Alon Lazo)  Cardiology; Internal Medicine; Interventional Cardiology  [address] [place] [address]  Phone: [phone]  Fax: [phone]  Follow Up Time:

## 2019-09-19 NOTE — DISCHARGE NOTE PROVIDER - NSDCFUSCHEDAPPT_GEN_ALL_CORE_FT
JAYSON HODGES LORA ; 09/30/2019 ; [facility] [address]  JAYSON HODGES LORA ; 11/18/2019 ; [facility] [address]

## 2019-09-19 NOTE — PROGRESS NOTE ADULT - SUBJECTIVE AND OBJECTIVE BOX
s/p RHC via RFV x2 #8 Bengali catheters with PFO Occluder Amplatz 25mm device for ASD  Tolerated procedure well w/o complications  Seen post procedure by Dr. Lazo with family present          REVIEW OF SYSTEMS:  Denies SOB, CP, NV, HA, dizziness, palpitations c/o right groin discomfort relieved with IV acetaminophen    PHYSICAL EXAM: A&Ox3 NAD Skin warm and dry  NEURO: Speech intact +gag +swallow Tongue midline RIVERA  NECK: No JVD, trachea midline. Eupneic  HEART: RRR S1S2 no g/m Tele/ECG SR no changes no ectopy  PULMONARY:  CTA luna  ABDOMEN: Soft nontender X4 +BS   EXTREMITIES: #8 RFV sheaths x2 insitu w/o bleed, hematoma, swelling or further pain. Rt DP/PT palpable s/p RHC via RFV x2 #8 Romans[place] catheters with PFO Occluder Amplatz 25mm device for ASD  Tolerated procedure well w/o complications  Seen post procedure by Dr. Lazo with family present          REVIEW OF SYSTEMS:  Denies SOB, CP, NV, HA, dizziness, palpitations c/o right groin discomfort relieved with IV acetaminophen    PHYSICAL EXAM: A&Ox3 NAD Skin warm and dry  NEURO: Speech intact +gag +swallow Tongue midline RIVERA  NECK: No JVD, trachea midline. Eupneic  HEART: RRR S1S2 no g/m Tele/ECG SR no changes no ectopy  PULMONARY:  CTA luna  ABDOMEN: Soft nontender X4 +BS   EXTREMITIES: #8 RFV sheaths x2 insitu w/o bleed, hematoma, swelling or further pain. Rt DP/PT palpable    1200 #8 RFV sheaths x2 aseptically removed intact with adequate hemostasis after 25 min hold.  Right groin care instructions reviewed w/pt DSD applied Site w/o bleed, hematoma, ecchymosis or pain  Rt DP/PT+palpable s/p RHC via RFV x2 #8 Spanish catheters with PFO Occluder Amplatz 25mm device for ASD  Tolerated procedure well w/o complications  Seen post procedure by Dr. Lazo with family present          REVIEW OF SYSTEMS:  Denies SOB, CP, NV, HA, dizziness, palpitations c/o right groin discomfort relieved with IV acetaminophen    PHYSICAL EXAM: A&Ox3 NAD Skin warm and dry  NEURO: Speech intact +gag +swallow Tongue midline RIVERA  NECK: No JVD, trachea midline. Eupneic  HEART: RRR S1S2 no g/m Tele/ECG SR no changes no ectopy  PULMONARY:  CTA luna  ABDOMEN: Soft nontender X4 +BS   EXTREMITIES: #8 RFV sheaths x2 insitu w/o bleed, hematoma, swelling or further pain. Rt DP/PT palpable    1200 #8 RFV sheaths x2 aseptically removed intact with adequate hemostasis after 25 min hold.  Right groin care instructions reviewed w/pt DSD applied Site w/o bleed, hematoma, ecchymosis or pain  Rt DP/PT+palpable    1630 c/o chest pain after eating. ECHO done (results pending) 12 ECG rendered  Fentanyl 25 mcg IVP ordered. s/p RHC via RFV x2 #8 Kitt[place] catheters with PFO Occluder Amplatz 25mm device for ASD  Tolerated procedure well w/o complications  Seen post procedure by Dr. Lazo with family present    REVIEW OF SYSTEMS:  Denies SOB, CP, NV, HA, dizziness, palpitations c/o right groin discomfort relieved with IV acetaminophen    PHYSICAL EXAM: A&Ox3 NAD Skin warm and dry  NEURO: Speech intact +gag +swallow Tongue midline RIVERA  NECK: No JVD, trachea midline. Eupneic  HEART: RRR S1S2 no g/m Tele/ECG SR no changes no ectopy  PULMONARY:  CTA luna  ABDOMEN: Soft nontender X4 +BS   EXTREMITIES: #8 RFV sheaths x2 insitu w/o bleed, hematoma, swelling or further pain. Rt DP/PT palpable    1200 #8 RFV sheaths x2 aseptically removed intact with adequate hemostasis after 25 min hold.  Right groin care instructions reviewed w/pt DSD applied Site w/o bleed, hematoma, ecchymosis or pain  Rt DP/PT+palpable    1630 c/o chest pain after eating.   ECHO:  Summary:   1. Limited study to follow up after PFO closure.   2. Left ventricular ejection fraction byvisual estimation is 60 to 65%.   3. Normal global left ventricular systolic function.   4. Valves were not assessed on this study.   5. Trivial pericardial effusion without evidence of tamponade.   6. There is a significant pericardial fat pad present.   7. No prior studies available for comparison.    1700: 12 ECG   III, F V3 ECG tracings noted. Tele SR no arrythmia  Fentanyl 25 mcg IVP with good results with marked reduction in chest pain  Ate well w/o further c/o.  Dr Lazo made aware ADMIT FOR HEMODYNAMIC MONITORING  s/p RHC via RFV x2 #8 [place] catheters with PFO Occluder Amplatz 25mm device for ASD  Tolerated procedure well w/o complications  Seen post procedure by Dr. Lazo with family present    REVIEW OF SYSTEMS:  Denies SOB, CP, NV, HA, dizziness, palpitations c/o right groin discomfort relieved with IV acetaminophen    PHYSICAL EXAM: A&Ox3 NAD Skin warm and dry  NEURO: Speech intact +gag +swallow Tongue midline RIVERA  NECK: No JVD, trachea midline. Eupneic  HEART: RRR S1S2 no g/m Tele/ECG SR no changes no ectopy  PULMONARY:  CTA luna  ABDOMEN: Soft nontender X4 +BS   EXTREMITIES: #8 RFV sheaths x2 insitu w/o bleed, hematoma, swelling or further pain. Rt DP/PT palpable    1200 #8 RFV sheaths x2 aseptically removed intact with adequate hemostasis after 25 min hold.  Right groin care instructions reviewed w/pt DSD applied Site w/o bleed, hematoma, ecchymosis or pain  Rt DP/PT+palpable    1630 c/o chest pain after eating.   ECHO:  Summary:   1. Limited study to follow up after PFO closure.   2. Left ventricular ejection fraction byvisual estimation is 60 to 65%.   3. Normal global left ventricular systolic function.   4. Valves were not assessed on this study.   5. Trivial pericardial effusion without evidence of tamponade.   6. There is a significant pericardial fat pad present.   7. No prior studies available for comparison.    1700: 12 ECG   III, F V3 ECG tracings noted. Tele SR no arrythmia  Fentanyl 25 mcg IVP with good results with marked reduction in chest pain  Ate well w/o further c/o.  Dr Lazo made aware

## 2019-09-19 NOTE — DISCHARGE NOTE PROVIDER - NSDCFUADDAPPT_GEN_ALL_CORE_FT
ELIE Morrison Clifton 1-2 weeks  Daily aspirin 81 and plavix 75mg ELIE Morrison Clifton 1-2 weeks  Daily aspirin 81 and plavix 75mg :Your prescription was sent to your pharmacy

## 2019-09-19 NOTE — PROGRESS NOTE ADULT - SUBJECTIVE AND OBJECTIVE BOX
NPO> 8 hours   States daily compliance with aspirin 81 and taken today  Consent by anesthesiologist /  Clifton  Labs reviewed  Shellfish allergy noted Can accept contrast w/o premedication  History of Present Illness:  History of Present Illness		  58 year old with c/o dyspnea found to have a right to left intracardiac shunt at the atrium septum.  For ASD closure    Allergy to shellfish but has tolerated IV dye          REVIEW OF SYSTEMS:  Denies SOB, CP, NV, HA, dizziness, palpitations,     PHYSICAL EXAM: A&Ox3 NAD Skin warm and dry  NEURO: Speech intact +gag +swallow Tongue midline RIVERA  NECK: No JVD, trachea midline. Eupneic  HEART: RRR S1S2 no g/m  PULMONARY:  CTA luna  ABDOMEN: Soft nontender X4 +BS Vdg  EXTREMITIES: Sl edema

## 2019-09-19 NOTE — PROGRESS NOTE ADULT - PROBLEM SELECTOR PLAN 1
Daily aspirin 81mg and plavix 75mg po  ECHO tonight  Remove sheaths at 12N  CBR 4 hours post sheath removal  AM labs/ECG  FU Dr Lazo outpt

## 2019-09-19 NOTE — DISCHARGE NOTE PROVIDER - NSDCCPTREATMENT_GEN_ALL_CORE_FT
PRINCIPAL PROCEDURE  Procedure: Closure of atrial septal defect (ASD) or patent foramen ovale (PFO)  Findings and Treatment: Amplatz 25 mm Occluder

## 2019-09-19 NOTE — DISCHARGE NOTE PROVIDER - NSDCACTIVITY_GEN_ALL_CORE
Do not make important decisions/No heavy lifting/straining/Do not drive or operate machinery/Showering allowed

## 2019-09-19 NOTE — DISCHARGE NOTE PROVIDER - HOSPITAL COURSE
S/P RHC for placement of PFO Amplatz Occluder 25 mm for ASD S/P RHC for placement of PFO Amplatz Occluder 25 mm for ASD via #8 sheaths via RFV    c/o chest pressure and SOB post procedure monitored with serial ECHOs/ECGs     Seen today with Dr Lazo    [place] ambulating/eating without issue    Tele overnight post procedure no ectopy SR    VSS Afebrile         REVIEW OF SYSTEMS:    Denies SOB, CP, NV, HA, dizziness, palpitations, site pain        PHYSICAL EXAM: A&Ox3 NAD Skin warm and dry    NEURO: Speech intact +gag +swallow Tongue midline RIVERA    NECK: No JVD, trachea midline. Eupneic    HEART: RRR S1S2 no g/m or rub, ECG without acute changes    PULMONARY:  CTA luna    ABDOMEN: Soft nontender X4 +BS Vdg/eating    EXTREMITIES: RFV site: No bleed, hematoma, pain, ecchymosis or swelling Rt DP/PT+        A-S/P RHC for placement of PFO Amplatz Occluder 25 mm for ASD via #8 sheaths via RFV        P- Right groin care instructions to pt    FU Dr Lazo 1-2 weeks    Daily aspirin 81/plavix 75 Rx to pharmacy    Increase hydration and monitor activity next 5 days

## 2019-09-19 NOTE — DISCHARGE NOTE PROVIDER - NSDCCPCAREPLAN_GEN_ALL_CORE_FT
PRINCIPAL DISCHARGE DIAGNOSIS  Diagnosis: ASD (atrial septal defect)  Assessment and Plan of Treatment:

## 2019-09-20 ENCOUNTER — TRANSCRIPTION ENCOUNTER (OUTPATIENT)
Age: 59
End: 2019-09-20

## 2019-09-20 VITALS
RESPIRATION RATE: 16 BRPM | SYSTOLIC BLOOD PRESSURE: 132 MMHG | HEART RATE: 73 BPM | TEMPERATURE: 98 F | DIASTOLIC BLOOD PRESSURE: 83 MMHG | OXYGEN SATURATION: 97 %

## 2019-09-20 LAB
ANION GAP SERPL CALC-SCNC: 12 MMOL/L — SIGNIFICANT CHANGE UP (ref 5–17)
BUN SERPL-MCNC: 15 MG/DL — SIGNIFICANT CHANGE UP (ref 8–20)
CALCIUM SERPL-MCNC: 9 MG/DL — SIGNIFICANT CHANGE UP (ref 8.6–10.2)
CHLORIDE SERPL-SCNC: 107 MMOL/L — SIGNIFICANT CHANGE UP (ref 98–107)
CO2 SERPL-SCNC: 23 MMOL/L — SIGNIFICANT CHANGE UP (ref 22–29)
CREAT SERPL-MCNC: 0.76 MG/DL — SIGNIFICANT CHANGE UP (ref 0.5–1.3)
GLUCOSE SERPL-MCNC: 125 MG/DL — HIGH (ref 70–115)
HCT VFR BLD CALC: 33.9 % — LOW (ref 34.5–45)
HGB BLD-MCNC: 10.7 G/DL — LOW (ref 11.5–15.5)
MAGNESIUM SERPL-MCNC: 2.1 MG/DL — SIGNIFICANT CHANGE UP (ref 1.6–2.6)
MCHC RBC-ENTMCNC: 29.2 PG — SIGNIFICANT CHANGE UP (ref 27–34)
MCHC RBC-ENTMCNC: 31.6 GM/DL — LOW (ref 32–36)
MCV RBC AUTO: 92.4 FL — SIGNIFICANT CHANGE UP (ref 80–100)
PLATELET # BLD AUTO: 336 K/UL — SIGNIFICANT CHANGE UP (ref 150–400)
POTASSIUM SERPL-MCNC: 3.9 MMOL/L — SIGNIFICANT CHANGE UP (ref 3.5–5.3)
POTASSIUM SERPL-SCNC: 3.9 MMOL/L — SIGNIFICANT CHANGE UP (ref 3.5–5.3)
RBC # BLD: 3.67 M/UL — LOW (ref 3.8–5.2)
RBC # FLD: 13.4 % — SIGNIFICANT CHANGE UP (ref 10.3–14.5)
SODIUM SERPL-SCNC: 142 MMOL/L — SIGNIFICANT CHANGE UP (ref 135–145)
WBC # BLD: 10.43 K/UL — SIGNIFICANT CHANGE UP (ref 3.8–10.5)
WBC # FLD AUTO: 10.43 K/UL — SIGNIFICANT CHANGE UP (ref 3.8–10.5)

## 2019-09-20 PROCEDURE — 86900 BLOOD TYPING SEROLOGIC ABO: CPT

## 2019-09-20 PROCEDURE — 99238 HOSP IP/OBS DSCHRG MGMT 30/<: CPT

## 2019-09-20 PROCEDURE — 93308 TTE F-UP OR LMTD: CPT

## 2019-09-20 PROCEDURE — 93010 ELECTROCARDIOGRAM REPORT: CPT

## 2019-09-20 PROCEDURE — C1894: CPT

## 2019-09-20 PROCEDURE — 85027 COMPLETE CBC AUTOMATED: CPT

## 2019-09-20 PROCEDURE — 36415 COLL VENOUS BLD VENIPUNCTURE: CPT

## 2019-09-20 PROCEDURE — 83735 ASSAY OF MAGNESIUM: CPT

## 2019-09-20 PROCEDURE — 93308 TTE F-UP OR LMTD: CPT | Mod: 26

## 2019-09-20 PROCEDURE — C8929: CPT

## 2019-09-20 PROCEDURE — C1817: CPT

## 2019-09-20 PROCEDURE — 80048 BASIC METABOLIC PNL TOTAL CA: CPT

## 2019-09-20 PROCEDURE — C1769: CPT

## 2019-09-20 PROCEDURE — 86850 RBC ANTIBODY SCREEN: CPT

## 2019-09-20 PROCEDURE — C1887: CPT

## 2019-09-20 PROCEDURE — C1759: CPT

## 2019-09-20 PROCEDURE — 93005 ELECTROCARDIOGRAM TRACING: CPT

## 2019-09-20 PROCEDURE — 93580 TRANSCATH CLOSURE OF ASD: CPT

## 2019-09-20 PROCEDURE — 86901 BLOOD TYPING SEROLOGIC RH(D): CPT

## 2019-09-20 PROCEDURE — 93662 INTRACARDIAC ECG (ICE): CPT

## 2019-09-20 PROCEDURE — C1889: CPT

## 2019-09-20 RX ORDER — ACETAMINOPHEN 500 MG
1000 TABLET ORAL ONCE
Refills: 0 | Status: COMPLETED | OUTPATIENT
Start: 2019-09-20 | End: 2019-09-20

## 2019-09-20 RX ORDER — CLOPIDOGREL BISULFATE 75 MG/1
1 TABLET, FILM COATED ORAL
Qty: 90 | Refills: 3
Start: 2019-09-20 | End: 2020-09-13

## 2019-09-20 RX ADMIN — Medication 400 MILLIGRAM(S): at 07:25

## 2019-09-20 RX ADMIN — PANTOPRAZOLE SODIUM 40 MILLIGRAM(S): 20 TABLET, DELAYED RELEASE ORAL at 06:40

## 2019-09-20 RX ADMIN — Medication 81 MILLIGRAM(S): at 11:02

## 2019-09-20 RX ADMIN — CLOPIDOGREL BISULFATE 75 MILLIGRAM(S): 75 TABLET, FILM COATED ORAL at 11:03

## 2019-09-20 RX ADMIN — Medication 1000 MILLIGRAM(S): at 08:00

## 2019-09-20 NOTE — DISCHARGE NOTE NURSING/CASE MANAGEMENT/SOCIAL WORK - PATIENT PORTAL LINK FT
You can access the FollowMyHealth Patient Portal offered by [facility] by registering at the following website: [email or web address]. By joining [employer]’s FollowMyHealth portal, you will also be able to view your health information using other applications (apps) compatible with our system.

## 2019-09-23 PROBLEM — M25.562 PAIN IN LEFT KNEE: Chronic | Status: ACTIVE | Noted: 2019-09-10

## 2019-09-23 PROBLEM — Q21.1 ATRIAL SEPTAL DEFECT: Chronic | Status: ACTIVE | Noted: 2019-09-10

## 2019-09-23 PROBLEM — E03.9 HYPOTHYROIDISM, UNSPECIFIED: Chronic | Status: ACTIVE | Noted: 2019-09-10

## 2019-09-23 PROBLEM — Z86.19 PERSONAL HISTORY OF OTHER INFECTIOUS AND PARASITIC DISEASES: Chronic | Status: ACTIVE | Noted: 2019-09-10

## 2019-09-30 ENCOUNTER — APPOINTMENT (OUTPATIENT)
Dept: CARDIOLOGY | Facility: CLINIC | Age: 59
End: 2019-09-30
Payer: COMMERCIAL

## 2019-09-30 VITALS
OXYGEN SATURATION: 96 % | WEIGHT: 208 LBS | HEIGHT: 68 IN | DIASTOLIC BLOOD PRESSURE: 70 MMHG | SYSTOLIC BLOOD PRESSURE: 102 MMHG | HEART RATE: 80 BPM | BODY MASS INDEX: 31.52 KG/M2

## 2019-09-30 PROCEDURE — 99214 OFFICE O/P EST MOD 30 MIN: CPT

## 2019-09-30 NOTE — DISCUSSION/SUMMARY
[FreeTextEntry1] : 59-year-old female, now comes in after a recent intra-atrial septal occlusion device placement with complaint of vertigo.\par She has upper airway cordlike symptoms without any fever or chills or cough.\par Recommended CT scan of the head without contrast specifically with recent intracardiac procedure. Unable to do MRI at present.\par Meclizine prescribed.\par If any significant worsening. She will see her primary care physician or emergency room.\par \par She will continue with her aspirin and Plavix as advised by interventional cardiologist.\par She will have repeat echocardiogram limited to evaluate into atrial septal occlusion device and shunt.\par \par Counseling regarding low saturated fat, salt and carbohydrate intake was reviewed. Active lifestyle and regular. Exercise along with weight management is advised.\par All the above were at length reviewed. Answered all the questions. Thank you very much for this kind referral. Please do not hesitate to give me a call for any question.\par Part of this transcription was done with voice recognition software and phonetically similar errors are common. I apologize for that. Please do not hesitate to call for any questions due to above.\par \par She will otherwise see me in 3 months.

## 2019-09-30 NOTE — PHYSICAL EXAM
[General Appearance - Well Developed] : well developed [Normal Appearance] : normal appearance [Well Groomed] : well groomed [General Appearance - Well Nourished] : well nourished [No Deformities] : no deformities [General Appearance - In No Acute Distress] : no acute distress [Normal Conjunctiva] : the conjunctiva exhibited no abnormalities [Eyelids - No Xanthelasma] : the eyelids demonstrated no xanthelasmas [Normal Oral Mucosa] : normal oral mucosa [No Oral Pallor] : no oral pallor [No Oral Cyanosis] : no oral cyanosis [Normal Jugular Venous V Waves Present] : normal jugular venous V waves present [Respiration, Rhythm And Depth] : normal respiratory rhythm and effort [Exaggerated Use Of Accessory Muscles For Inspiration] : no accessory muscle use [Heart Sounds] : normal S1 and S2 [Heart Rate And Rhythm] : heart rate and rhythm were normal [FreeTextEntry1] : Systolic murmur at the base. No gallop, or rub.\par Trace ankle edema, \par Varicose veins [Arterial Pulses Normal] : the arterial pulses were normal [Abdomen Soft] : soft [Abnormal Walk] : normal gait [Gait - Sufficient For Exercise Testing] : the gait was sufficient for exercise testing [Nail Clubbing] : no clubbing of the fingernails [Cyanosis, Localized] : no localized cyanosis [Skin Color & Pigmentation] : normal skin color and pigmentation [] : no rash [No Venous Stasis] : no venous stasis [Skin Lesions] : no skin lesions [No Skin Ulcers] : no skin ulcer [No Xanthoma] : no  xanthoma was observed [Oriented To Time, Place, And Person] : oriented to person, place, and time [Affect] : the affect was normal [Mood] : the mood was normal [No Anxiety] : not feeling anxious

## 2019-09-30 NOTE — REASON FOR VISIT
[Follow-Up - Clinic] : a clinic follow-up of [Chest Pain] : chest pain [Fatigue] : feeling tired (fatigue) [Dyspnea] : dyspnea [Palpitations] : palpitations [FreeTextEntry1] : 59-year-old female comes in for followup consultation of the recent closure of her large PFO/ASD.\par Her main symptoms are upper respiratory symptoms associated with vertigo-type of symptoms and nausea.\par She has no significant PND or orthopnea.\par She has occasional palpitation. No syncopal event.\par She has no further chest pain after an episode while in the hospital after the procedure.\par Her activity level is still limited.\par \par Her activity level is limited because of significant osteoarthritis.\par \par \par

## 2019-09-30 NOTE — ASSESSMENT
[FreeTextEntry1] : EKG Elana February 27, 2019. Normal sinus rhythm. . Infero-anterolateral ST-T wave changes nonspecific. Rightward axis.\par \par Reviewed on April 1, 2019\par Echocardiogram. Preserved ejection fraction. Possible PFO/ASD.\par Myocardial perfusion scan pharmacological. Nonischemic with preserved ejection fraction\par \par Reviewed on July 22, 2019\par Transesophageal echocardiogram June 25, 2019. Ejection fraction 65%. Intra-atrial septum aneurysmal. PFO/ASD noted 0.3-0.4 cm. Significant passage of bubbles from right to left. Mild tricuspid regurgitation.\par Lipid panel with ASCVD, risk, less than 2% over 10 years. Reviewed.\par \par Reviewed on September 30, 2019\par Exercise treadmill stress tests was nonischemic, but with evidence of desaturation, and shortness of breath.\par September 2019. Limited echocardiogram. LV ejection fraction 60-65%. Stable inter-atrial septal occluder device. Trace pericardial effusion.\par

## 2019-10-04 ENCOUNTER — APPOINTMENT (OUTPATIENT)
Dept: CARDIOLOGY | Facility: CLINIC | Age: 59
End: 2019-10-04
Payer: COMMERCIAL

## 2019-10-04 PROCEDURE — 93308 TTE F-UP OR LMTD: CPT

## 2019-10-07 ENCOUNTER — NON-APPOINTMENT (OUTPATIENT)
Age: 59
End: 2019-10-07

## 2019-10-07 ENCOUNTER — APPOINTMENT (OUTPATIENT)
Dept: CARDIOLOGY | Facility: CLINIC | Age: 59
End: 2019-10-07
Payer: COMMERCIAL

## 2019-10-07 VITALS
HEIGHT: 68 IN | BODY MASS INDEX: 30.62 KG/M2 | SYSTOLIC BLOOD PRESSURE: 128 MMHG | HEART RATE: 71 BPM | WEIGHT: 202 LBS | OXYGEN SATURATION: 100 % | DIASTOLIC BLOOD PRESSURE: 86 MMHG

## 2019-10-07 PROCEDURE — 99214 OFFICE O/P EST MOD 30 MIN: CPT

## 2019-10-07 PROCEDURE — 93000 ELECTROCARDIOGRAM COMPLETE: CPT

## 2019-10-07 RX ORDER — LINACLOTIDE 290 UG/1
290 CAPSULE, GELATIN COATED ORAL
Refills: 0 | Status: ACTIVE | COMMUNITY
Start: 2018-02-23

## 2019-10-07 RX ORDER — MECLIZINE HYDROCHLORIDE 25 MG/1
25 TABLET ORAL EVERY 6 HOURS
Qty: 20 | Refills: 0 | Status: DISCONTINUED | COMMUNITY
Start: 2019-09-30 | End: 2019-10-07

## 2019-10-07 RX ORDER — ASPIRIN 81 MG
81 TABLET, DELAYED RELEASE (ENTERIC COATED) ORAL DAILY
Refills: 0 | Status: ACTIVE | COMMUNITY

## 2019-11-18 ENCOUNTER — APPOINTMENT (OUTPATIENT)
Dept: CARDIOLOGY | Facility: CLINIC | Age: 59
End: 2019-11-18
Payer: COMMERCIAL

## 2019-11-18 VITALS
OXYGEN SATURATION: 97 % | DIASTOLIC BLOOD PRESSURE: 74 MMHG | HEIGHT: 68 IN | SYSTOLIC BLOOD PRESSURE: 122 MMHG | HEART RATE: 75 BPM | BODY MASS INDEX: 28.79 KG/M2 | WEIGHT: 190 LBS

## 2019-11-18 PROCEDURE — 99214 OFFICE O/P EST MOD 30 MIN: CPT

## 2019-11-18 NOTE — PHYSICAL EXAM
[General Appearance - Well Developed] : well developed [Well Groomed] : well groomed [Normal Appearance] : normal appearance [No Deformities] : no deformities [General Appearance - In No Acute Distress] : no acute distress [General Appearance - Well Nourished] : well nourished [Eyelids - No Xanthelasma] : the eyelids demonstrated no xanthelasmas [Normal Conjunctiva] : the conjunctiva exhibited no abnormalities [Normal Oral Mucosa] : normal oral mucosa [No Oral Pallor] : no oral pallor [Normal Jugular Venous V Waves Present] : normal jugular venous V waves present [No Oral Cyanosis] : no oral cyanosis [Exaggerated Use Of Accessory Muscles For Inspiration] : no accessory muscle use [Respiration, Rhythm And Depth] : normal respiratory rhythm and effort [Heart Rate And Rhythm] : heart rate and rhythm were normal [FreeTextEntry1] : Systolic murmur at the base. No gallop, or rub.\par Trace ankle edema, \par Varicose veins [Heart Sounds] : normal S1 and S2 [Arterial Pulses Normal] : the arterial pulses were normal [Gait - Sufficient For Exercise Testing] : the gait was sufficient for exercise testing [Abnormal Walk] : normal gait [Abdomen Soft] : soft [Cyanosis, Localized] : no localized cyanosis [Nail Clubbing] : no clubbing of the fingernails [No Venous Stasis] : no venous stasis [Skin Color & Pigmentation] : normal skin color and pigmentation [] : no ischemic changes [No Skin Ulcers] : no skin ulcer [Skin Lesions] : no skin lesions [No Xanthoma] : no  xanthoma was observed [Affect] : the affect was normal [Oriented To Time, Place, And Person] : oriented to person, place, and time [No Anxiety] : not feeling anxious [Mood] : the mood was normal

## 2019-11-18 NOTE — DISCUSSION/SUMMARY
[FreeTextEntry1] : 59-year-old female, now comes in after a recent intra-atrial septal occlusion device placement \par MRI of the brain was negative. Echocardiogram showed stable ASD repair\par Significant symptoms to suggest sleep apnea. Weakness, apneic episode. Motor activity stopped breathing and significant snoring associated with fatigue and tiredness.\par Atypical chest pain, noncardiac.\par No clinical signs of congestive heart failure.\par No significant cardiac arrhythmias.\par No significant bleeding complication on dual antiplatelet agent\par \par She will continue with her aspirin and Plavix as advised by interventional cardiologist.\par SBE prophylaxis for for 6 months after ASD repair.\par \par Counseling regarding low saturated fat, salt and carbohydrate intake was reviewed. Active lifestyle and regular. Exercise along with weight management is advised.\par All the above were at length reviewed. Answered all the questions. Thank you very much for this kind referral. Please do not hesitate to give me a call for any question.\par Part of this transcription was done with voice recognition software and phonetically similar errors are common. I apologize for that. Please do not hesitate to call for any questions due to above.\par \par She will otherwise see me in 6 months.

## 2019-11-18 NOTE — REASON FOR VISIT
[Follow-Up - Clinic] : a clinic follow-up of [Dyspnea] : dyspnea [Chest Pain] : chest pain [Fatigue] : feeling tired (fatigue) [Palpitations] : palpitations [FreeTextEntry1] : 59-year-old female comes in for followup consultation. Recent closure of her large PFO/ASD.To review MRI of the brain, echocardiogram\par Lack of exercise because of significant osteoarthritis.\par  has noticed significant snoring, intermittent apnea, intermittent. Motor activity, and continued fatigue and dyspnea. The symptoms have worsened over last few weeks according to her .\par She does not have any daytime early morning headaches. But does feel fatigued and tired.\par She has no significant PND or orthopnea.\par She has occasional palpitation. No syncopal event.\par She has occasional sharp pain in the epicardial region lasting for a few seconds without any radiation or associated other symptoms. Nonexertional. Nonprogressive.\par Osteoarthritis limiting her function \par \par \par \par

## 2019-11-18 NOTE — REVIEW OF SYSTEMS
[Fever] : no fever [Recent Weight Gain (___ Lbs)] : no recent weight gain [Headache] : no headache [Feeling Fatigued] : feeling fatigued [Recent Weight Loss (___ Lbs)] : no recent weight loss [Shortness Of Breath] : shortness of breath [Chest  Pressure] : no chest pressure [Dyspnea on exertion] : dyspnea during exertion [Chest Pain] : chest pain [Lower Ext Edema] : no extremity edema [Leg Claudication] : no intermittent leg claudication [Palpitations] : no palpitations [see HPI] : see HPI [Cough] : no cough [Wheezing] : no wheezing [Coughing Up Blood] : no hemoptysis [Joint Swelling] : joint swelling [Joint Pain] : joint pain [Joint Stiffness] : joint stiffness [FreeTextEntry1] : snoring,  [Negative] : Heme/Lymph

## 2019-11-18 NOTE — ASSESSMENT
[FreeTextEntry1] : EKG Elana February 27, 2019. Normal sinus rhythm. . Infero-anterolateral ST-T wave changes nonspecific. Rightward axis.\par \par Reviewed on April 1, 2019\par Echocardiogram. Preserved ejection fraction. Possible PFO/ASD.\par Myocardial perfusion scan pharmacological. Nonischemic with preserved ejection fraction\par \par Reviewed on July 22, 2019\par Transesophageal echocardiogram June 25, 2019. Ejection fraction 65%. Intra-atrial septum aneurysmal. PFO/ASD noted 0.3-0.4 cm. Significant passage of bubbles from right to left. Mild tricuspid regurgitation.\par Lipid panel with ASCVD, risk, less than 2% over 10 years. Reviewed.\par \par Reviewed on September 30, 2019\par Exercise treadmill stress tests was nonischemic, but with evidence of desaturation, and shortness of breath.\par September 2019. Limited echocardiogram. LV ejection fraction 60-65%. Stable inter-atrial septal occluder device. Trace pericardial effusion.\par \par Reviewed on November 18, 2019\par Noncontrast MRI of the brain was negative\par Echocardiogram P. October 4 was 19 and ejection fraction 60%. Stable ASD repair.

## 2019-11-19 NOTE — DISCUSSION/SUMMARY
[FreeTextEntry1] : Stable s/p PFO closure.  Follow up echo to be done with Dr. Umana at [facility].

## 2019-11-19 NOTE — PHYSICAL EXAM
[General Appearance - Well Developed] : well developed [Well Groomed] : well groomed [Normal Appearance] : normal appearance [No Deformities] : no deformities [General Appearance - Well Nourished] : well nourished [General Appearance - In No Acute Distress] : no acute distress [Normal Conjunctiva] : the conjunctiva exhibited no abnormalities [Normal Oral Mucosa] : normal oral mucosa [Eyelids - No Xanthelasma] : the eyelids demonstrated no xanthelasmas [No Oral Pallor] : no oral pallor [Normal Jugular Venous A Waves Present] : normal jugular venous A waves present [No Oral Cyanosis] : no oral cyanosis [Normal Jugular Venous V Waves Present] : normal jugular venous V waves present [No Jugular Venous Marin A Waves] : no jugular venous marin A waves [Exaggerated Use Of Accessory Muscles For Inspiration] : no accessory muscle use [Respiration, Rhythm And Depth] : normal respiratory rhythm and effort [Auscultation Breath Sounds / Voice Sounds] : lungs were clear to auscultation bilaterally [Heart Rate And Rhythm] : heart rate and rhythm were normal [Heart Sounds] : normal S1 and S2 [Murmurs] : no murmurs present [Abdomen Soft] : soft [Abdomen Tenderness] : non-tender [Abdomen Mass (___ Cm)] : no abdominal mass palpated [Abnormal Walk] : normal gait [Gait - Sufficient For Exercise Testing] : the gait was sufficient for exercise testing [Nail Clubbing] : no clubbing of the fingernails [Cyanosis, Localized] : no localized cyanosis [Petechial Hemorrhages (___cm)] : no petechial hemorrhages [Skin Color & Pigmentation] : normal skin color and pigmentation [] : no rash [No Venous Stasis] : no venous stasis [Skin Lesions] : no skin lesions [No Xanthoma] : no  xanthoma was observed [No Skin Ulcers] : no skin ulcer [Oriented To Time, Place, And Person] : oriented to person, place, and time [Affect] : the affect was normal [Mood] : the mood was normal [No Anxiety] : not feeling anxious

## 2019-11-26 ENCOUNTER — MOBILE ON CALL (OUTPATIENT)
Age: 59
End: 2019-11-26

## 2019-12-10 ENCOUNTER — NON-APPOINTMENT (OUTPATIENT)
Age: 59
End: 2019-12-10

## 2019-12-10 ENCOUNTER — APPOINTMENT (OUTPATIENT)
Dept: CARDIOLOGY | Facility: CLINIC | Age: 59
End: 2019-12-10
Payer: COMMERCIAL

## 2019-12-10 VITALS
OXYGEN SATURATION: 98 % | SYSTOLIC BLOOD PRESSURE: 112 MMHG | WEIGHT: 190 LBS | DIASTOLIC BLOOD PRESSURE: 74 MMHG | HEART RATE: 83 BPM | BODY MASS INDEX: 28.89 KG/M2

## 2019-12-10 PROCEDURE — 99214 OFFICE O/P EST MOD 30 MIN: CPT

## 2019-12-10 PROCEDURE — 93000 ELECTROCARDIOGRAM COMPLETE: CPT

## 2019-12-10 NOTE — PHYSICAL EXAM
[No Deformities] : no deformities [Normal Appearance] : normal appearance [Respiration, Rhythm And Depth] : normal respiratory rhythm and effort [] : no respiratory distress [Heart Rate And Rhythm] : heart rate and rhythm were normal [Heart Sounds] : normal S1 and S2 [Abdomen Soft] : soft [Murmurs] : no murmurs present [Bowel Sounds] : normal bowel sounds [Abdomen Tenderness] : non-tender [Skin Color & Pigmentation] : normal skin color and pigmentation [Affect] : the affect was normal [Mood] : the mood was normal [FreeTextEntry1] : minimal l/e edema decreased right l/ distal pulse ?claudication

## 2019-12-10 NOTE — DISCUSSION/SUMMARY
[FreeTextEntry1] : PLAN 12-10-19\par \par -Pre-op pending left knee replacement s/p PFO closure 9/19. Patient is with limited exertion without any signs of ischemia. Most recent cardiac testing reveals no evidence of ischemia. Patient recently received clearance from Dr. Lazo (cardiovascular surgeon), with most recent echo revealing no evidence residual flow patient. Instructions from Dr. Lazo were that plavix may be held starting 5 days prior to surgery. SBE prophylaxis advised as directed by surgeon. Preop labs per surgeons protocol. Patient is currently stable to proceed for her planned procedure. Montior vitals closely. DVT prophylaxis. \par \par -Large PFO/ASD closure with interatrial septal closure device. Echo as above 10-4-19 ef 60% with well seated closure and no evidence of color flow. Continue to follow with CV surgeon Dr. Lazo. note; SBE prophylaxis for for 6 months after ASD repair. On Asa and Plavix.\par \par -as noted previously symptoms of sleep apnea (pending sleep study, awaiting insurance company approval). \par \par -decreased right l/e distal pulse (good color, warm). ?signs of mild claudication. Advised OLEGARIO of right leg to r/o PVD (which can be done after her surgery). Red flag symptoms that would warrant emergent evaluation was r/w the patient. \par \par -lipid profile as above. ASCVD risk 1.67%. Stirct lifestyle modificaiton. \par fu after OLEGARIO, sooner if changes in symptoms or status. \par \par Sincerely,\par Dr. Valentino\par scribed by Lindy Lr PA-C\par patient reviewed and plan advised by supervising physician\par \par

## 2019-12-10 NOTE — REASON FOR VISIT
[Follow-Up - Clinic] : a clinic follow-up of [Chest Pain] : chest pain [Palpitations] : palpitations [Dyspnea] : dyspnea [Fatigue] : feeling tired (fatigue) [FreeTextEntry1] : \par \par \par

## 2019-12-10 NOTE — CARDIOLOGY SUMMARY
[No Ischemia] : no Ischemia [LVEF ___%] : LVEF [unfilled]% [___] : [unfilled] [Mild] : mild mitral regurgitation [Normal] : normal LA size [None] : no pulmonary hypertension

## 2019-12-10 NOTE — HISTORY OF PRESENT ILLNESS
[Preoperative Visit] : for a medical evaluation prior to surgery [FreeTextEntry1] : Mrs. Mittal is a 59 year old female that came in today 12-10-19 for cardiovascular preoperative clearance for left knee replacment pending 12/19/19 [facility]. \par She was last seen 11-18-19. \par \par As you know she is s/p large PFO/ASD closure with interatrial septal occlusion device on 9/19/19 (MRI brain was negative), significantly elevated CRP, mild apnea (pending sleep study approval by insurance) ?2 pillow orthopnea x years and osteooarthritis. \par \par She denies chest pain, sob, palpitations, dizziness, syncope or neurological events. \par \par Labs/tests\par \par ekg 12-10-19 NSR with nsstt (no sign changes)\par \par nuclear stress test [id number] (lexiscan) no evidence of ischemia by ekg with small, mild defect in anteraical wall that is fixed c/w breast attenuation ef 69%. \par EKG Elana February 27, 2019. Normal sinus rhythm. . Infero-anterolateral ST-T wave changes nonspecific. Rightward axis.\par \par Reviewed on April 1, 2019\par Echocardiogram. Preserved ejection fraction. Possible PFO/ASD.\par Myocardial perfusion scan pharmacological. Nonischemic with preserved ejection fraction\par \par Reviewed on July 22, 2019\par Transesophageal echocardiogram June 25, 2019. Ejection fraction 65%. Intra-atrial septum aneurysmal. PFO/ASD noted 0.3-0.4 cm. Significant passage of bubbles from right to left. Mild tricuspid regurgitation.\par Lipid panel with ASCVD, risk, less than 2% over 10 years. Reviewed.\par \par Reviewed on September 30, 2019\par Exercise treadmill stress tests was nonischemic, but with evidence of desaturation, and shortness of breath.\par September 2019. Limited echocardiogram. LV ejection fraction 60-65%. Stable inter-atrial septal occluder device. Trace pericardial effusion.\par \par Reviewed on November 18, 2019\par Noncontrast MRI of the brain was negative\par Echocardiogram P. October 4 was 19 and ejection fraction 60%. Stable ASD repair.\par \par labs 5-13-19 , HDL 71, trlgylcerides 76, K 4.3, NA+ 139, bun/creat 21/0.89, AST/ASLT 20/15, h/h 11.8/36.3\par \par \par

## 2020-01-08 ENCOUNTER — APPOINTMENT (OUTPATIENT)
Dept: CARDIOLOGY | Facility: CLINIC | Age: 60
End: 2020-01-08

## 2020-02-04 ENCOUNTER — APPOINTMENT (OUTPATIENT)
Dept: CARDIOLOGY | Facility: CLINIC | Age: 60
End: 2020-02-04

## 2020-02-29 ENCOUNTER — TRANSCRIPTION ENCOUNTER (OUTPATIENT)
Age: 60
End: 2020-02-29

## 2020-04-07 ENCOUNTER — TRANSCRIPTION ENCOUNTER (OUTPATIENT)
Age: 60
End: 2020-04-07

## 2020-05-29 ENCOUNTER — APPOINTMENT (OUTPATIENT)
Dept: RADIOLOGY | Facility: CLINIC | Age: 60
End: 2020-05-29

## 2020-06-15 ENCOUNTER — NON-APPOINTMENT (OUTPATIENT)
Age: 60
End: 2020-06-15

## 2020-06-15 ENCOUNTER — APPOINTMENT (OUTPATIENT)
Dept: CARDIOLOGY | Facility: CLINIC | Age: 60
End: 2020-06-15
Payer: COMMERCIAL

## 2020-06-15 VITALS
OXYGEN SATURATION: 99 % | HEART RATE: 73 BPM | WEIGHT: 200 LBS | DIASTOLIC BLOOD PRESSURE: 68 MMHG | HEIGHT: 68 IN | TEMPERATURE: 97.8 F | SYSTOLIC BLOOD PRESSURE: 110 MMHG | BODY MASS INDEX: 30.31 KG/M2

## 2020-06-15 DIAGNOSIS — Z01.810 ENCOUNTER FOR PREPROCEDURAL CARDIOVASCULAR EXAMINATION: ICD-10-CM

## 2020-06-15 PROCEDURE — 99215 OFFICE O/P EST HI 40 MIN: CPT

## 2020-06-15 PROCEDURE — 93000 ELECTROCARDIOGRAM COMPLETE: CPT

## 2020-06-15 RX ORDER — OMEPRAZOLE 40 MG/1
40 CAPSULE, DELAYED RELEASE ORAL
Qty: 90 | Refills: 0 | Status: ACTIVE | COMMUNITY
Start: 2020-03-10

## 2020-06-15 NOTE — PHYSICAL EXAM
[General Appearance - Well Developed] : well developed [Normal Appearance] : normal appearance [Well Groomed] : well groomed [No Deformities] : no deformities [General Appearance - Well Nourished] : well nourished [Normal Conjunctiva] : the conjunctiva exhibited no abnormalities [General Appearance - In No Acute Distress] : no acute distress [Eyelids - No Xanthelasma] : the eyelids demonstrated no xanthelasmas [Normal Oral Mucosa] : normal oral mucosa [No Oral Pallor] : no oral pallor [No Oral Cyanosis] : no oral cyanosis [Normal Jugular Venous V Waves Present] : normal jugular venous V waves present [Exaggerated Use Of Accessory Muscles For Inspiration] : no accessory muscle use [Respiration, Rhythm And Depth] : normal respiratory rhythm and effort [Heart Sounds] : normal S1 and S2 [Heart Rate And Rhythm] : heart rate and rhythm were normal [Arterial Pulses Normal] : the arterial pulses were normal [Abdomen Soft] : soft [Abnormal Walk] : normal gait [Nail Clubbing] : no clubbing of the fingernails [Gait - Sufficient For Exercise Testing] : the gait was sufficient for exercise testing [Skin Color & Pigmentation] : normal skin color and pigmentation [Cyanosis, Localized] : no localized cyanosis [Skin Lesions] : no skin lesions [No Venous Stasis] : no venous stasis [] : no rash [No Skin Ulcers] : no skin ulcer [Oriented To Time, Place, And Person] : oriented to person, place, and time [No Xanthoma] : no  xanthoma was observed [Affect] : the affect was normal [Mood] : the mood was normal [No Anxiety] : not feeling anxious [FreeTextEntry1] : Systolic murmur at the base. No gallop, or rub.\par Trace ankle edema, \par Varicose veins

## 2020-06-15 NOTE — REASON FOR VISIT
[Follow-Up - Clinic] : a clinic follow-up of [Chest Pain] : chest pain [Fatigue] : feeling tired (fatigue) [Dyspnea] : dyspnea [Palpitations] : palpitations [FreeTextEntry1] : 59-year-old female comes in for followup consultation. review her sleep apnea study. \par nassar. noticed more last two months as she works from home. inspite of trying to walk daily. more at one block walking fast- 1 mile walking.  No PND orthopnea pedal edema.  No chest pain.  No palpitation associated with that.  She has been trying to control her diet unable to lose weight.\par  Recent closure of her large PFO/ASD.\par  has noticed significant snoring, intermittent apnea, intermittent. Motor activity, and continued fatigue and dyspnea. The symptoms have worsened over last few weeks according to her .\par She does not have any daytime early morning headaches. But does feel slightly. fatigued and tired.\par She has occasional palpitation. No syncopal event.\par Osteoarthritis limiting her function \par \par \par \par

## 2020-06-15 NOTE — DISCUSSION/SUMMARY
[FreeTextEntry1] : 59-year-old female, now comes in after a recent intra-atrial septal occlusion device placement \par MRI of the brain was negative. Echocardiogram showed stable ASD repair\par Now with exertional dyspnea.  Sleep apnea study showing mild obstructive sleep apnea.\par EKG no acute changes.\par No clinical signs of congestive heart failure.\par No significant cardiac arrhythmias.\par On aspirin.\par \par She will have labs which include CBC CMP TSH BNP level to rule out metabolic etiologies\par Weight watchers or other group where she can do increasing exercise and diet control with weight reduction\par Echocardiogram will be repeated to follow-up on LV ejection fraction pulmonary artery systolic pressure RV function in presence of new symptoms since last echocardiogram and history of ASD repair\par Exercise treadmill stress test to assess evaluate blood pressure heart rate response pulse oximetry with exercise and as needed pulmonary evaluation with pulmonary function test\par More aggressive approach with lifestyle modification.  See how she does with her snoring and other symptoms related to mild sleep apnea.  If persistent consider ENT/pulmonary evaluation.\par \par Counseling regarding low saturated fat, salt and carbohydrate intake was reviewed. Active lifestyle and regular. Exercise along with weight management is advised.\par All the above were at length reviewed. Answered all the questions. Thank you very much for this kind referral. Please do not hesitate to give me a call for any question.\par Part of this transcription was done with voice recognition software and phonetically similar errors are common. I apologize for that. Please do not hesitate to call for any questions due to above.\par \par She will otherwise see me in 6 months.

## 2020-06-15 NOTE — ASSESSMENT
[FreeTextEntry1] : EKG Elana February 27, 2019. Normal sinus rhythm. . Infero-anterolateral ST-T wave changes nonspecific. Rightward axis.\par \par Reviewed on April 1, 2019\par Echocardiogram. Preserved ejection fraction. Possible PFO/ASD.\par Myocardial perfusion scan pharmacological. Nonischemic with preserved ejection fraction\par \par Reviewed on July 22, 2019\par Transesophageal echocardiogram June 25, 2019. Ejection fraction 65%. Intra-atrial septum aneurysmal. PFO/ASD noted 0.3-0.4 cm. Significant passage of bubbles from right to left. Mild tricuspid regurgitation.\par Lipid panel with ASCVD, risk, less than 2% over 10 years. Reviewed.\par \par Reviewed on September 30, 2019\par Exercise treadmill stress tests was nonischemic, but with evidence of desaturation, and shortness of breath.\par September 2019. Limited echocardiogram. LV ejection fraction 60-65%. Stable inter-atrial septal occluder device. Trace pericardial effusion.\par \par Reviewed on November 18, 2019\par Noncontrast MRI of the brain was negative\par Echocardiogram P. October 4 was 19 and ejection fraction 60%. Stable ASD repair.\par \par Reviewed on Maryan 15, 2020.\par EKG normal sinus rhythm.  Nonspecific ST-T changes.

## 2020-06-15 NOTE — REVIEW OF SYSTEMS
This is a 18 y/o male presenting to the ED with bug in his right ear since this evening. Pt admits to pain. He denies decrease in hearing, fever, chills, nausea, vomiting, discharge. [Feeling Fatigued] : feeling fatigued [Dyspnea on exertion] : dyspnea during exertion [Shortness Of Breath] : shortness of breath [Chest Pain] : chest pain [see HPI] : see HPI [Joint Pain] : joint pain [Joint Swelling] : joint swelling [Joint Stiffness] : joint stiffness [Negative] : Heme/Lymph [Fever] : no fever [Recent Weight Gain (___ Lbs)] : no recent weight gain [Headache] : no headache [Recent Weight Loss (___ Lbs)] : no recent weight loss [Chest  Pressure] : no chest pressure [Leg Claudication] : no intermittent leg claudication [Lower Ext Edema] : no extremity edema [Palpitations] : no palpitations [Wheezing] : no wheezing [Cough] : no cough [Coughing Up Blood] : no hemoptysis [FreeTextEntry1] : snoring,

## 2020-06-26 ENCOUNTER — OUTPATIENT (OUTPATIENT)
Dept: OUTPATIENT SERVICES | Facility: HOSPITAL | Age: 60
LOS: 1 days | End: 2020-06-26
Payer: COMMERCIAL

## 2020-06-26 ENCOUNTER — APPOINTMENT (OUTPATIENT)
Dept: ULTRASOUND IMAGING | Facility: CLINIC | Age: 60
End: 2020-06-26
Payer: COMMERCIAL

## 2020-06-26 ENCOUNTER — APPOINTMENT (OUTPATIENT)
Dept: RADIOLOGY | Facility: CLINIC | Age: 60
End: 2020-06-26
Payer: COMMERCIAL

## 2020-06-26 ENCOUNTER — APPOINTMENT (OUTPATIENT)
Dept: MAMMOGRAPHY | Facility: CLINIC | Age: 60
End: 2020-06-26
Payer: COMMERCIAL

## 2020-06-26 DIAGNOSIS — R92.8 OTHER ABNORMAL AND INCONCLUSIVE FINDINGS ON DIAGNOSTIC IMAGING OF BREAST: ICD-10-CM

## 2020-06-26 DIAGNOSIS — Z13.820 ENCOUNTER FOR SCREENING FOR OSTEOPOROSIS: ICD-10-CM

## 2020-06-26 DIAGNOSIS — Z98.890 OTHER SPECIFIED POSTPROCEDURAL STATES: Chronic | ICD-10-CM

## 2020-06-26 PROCEDURE — 77080 DXA BONE DENSITY AXIAL: CPT | Mod: 26

## 2020-06-26 PROCEDURE — 77063 BREAST TOMOSYNTHESIS BI: CPT

## 2020-06-26 PROCEDURE — 77067 SCR MAMMO BI INCL CAD: CPT | Mod: 26

## 2020-06-26 PROCEDURE — 77063 BREAST TOMOSYNTHESIS BI: CPT | Mod: 26

## 2020-06-26 PROCEDURE — 77067 SCR MAMMO BI INCL CAD: CPT

## 2020-06-26 PROCEDURE — 77080 DXA BONE DENSITY AXIAL: CPT

## 2020-06-26 PROCEDURE — 76641 ULTRASOUND BREAST COMPLETE: CPT | Mod: 26,RT

## 2020-06-26 PROCEDURE — 76641 ULTRASOUND BREAST COMPLETE: CPT | Mod: 26,LT

## 2020-06-26 PROCEDURE — 76641 ULTRASOUND BREAST COMPLETE: CPT

## 2020-07-14 ENCOUNTER — APPOINTMENT (OUTPATIENT)
Dept: CARDIOLOGY | Facility: CLINIC | Age: 60
End: 2020-07-14
Payer: COMMERCIAL

## 2020-07-14 PROCEDURE — 93306 TTE W/DOPPLER COMPLETE: CPT

## 2020-07-23 ENCOUNTER — APPOINTMENT (OUTPATIENT)
Dept: CARDIOLOGY | Facility: CLINIC | Age: 60
End: 2020-07-23
Payer: COMMERCIAL

## 2020-07-23 PROCEDURE — 93015 CV STRESS TEST SUPVJ I&R: CPT

## 2020-07-28 ENCOUNTER — APPOINTMENT (OUTPATIENT)
Dept: CARDIOLOGY | Facility: CLINIC | Age: 60
End: 2020-07-28
Payer: COMMERCIAL

## 2020-07-28 VITALS
BODY MASS INDEX: 30.62 KG/M2 | WEIGHT: 202 LBS | OXYGEN SATURATION: 98 % | HEART RATE: 85 BPM | HEIGHT: 68 IN | SYSTOLIC BLOOD PRESSURE: 110 MMHG | DIASTOLIC BLOOD PRESSURE: 70 MMHG

## 2020-07-28 PROCEDURE — 99215 OFFICE O/P EST HI 40 MIN: CPT

## 2020-07-28 RX ORDER — CLOPIDOGREL 75 MG/1
75 TABLET, FILM COATED ORAL DAILY
Refills: 0 | Status: DISCONTINUED | COMMUNITY
End: 2020-07-28

## 2020-07-28 NOTE — REASON FOR VISIT
[Follow-Up - Clinic] : a clinic follow-up of [Chest Pain] : chest pain [Dyspnea] : dyspnea [Fatigue] : feeling tired (fatigue) [Palpitations] : palpitations [FreeTextEntry1] : 59-year-old female comes in for followup consultation.  To review her multiple test which includes echocardiogram, exercise treadmill stress test and labs.\par She has dyspnea on exertion while climbing stairs with laundry basket or other stuff in her hand.  Her exercise tolerance is gradually improving as she walks with her friend at present.  Though she still feels at times exhausted.  She has no chest pain PND orthopnea pedal edema\par Her weight is stable\par She has no visual disturbances focal weakness\par She has no chest pain.\par She has no palpitation near syncopal or syncopal event.\par \par \par \par

## 2020-07-28 NOTE — PHYSICAL EXAM
[General Appearance - Well Developed] : well developed [Normal Appearance] : normal appearance [Well Groomed] : well groomed [General Appearance - Well Nourished] : well nourished [No Deformities] : no deformities [General Appearance - In No Acute Distress] : no acute distress [Normal Conjunctiva] : the conjunctiva exhibited no abnormalities [Eyelids - No Xanthelasma] : the eyelids demonstrated no xanthelasmas [Normal Oral Mucosa] : normal oral mucosa [No Oral Pallor] : no oral pallor [No Oral Cyanosis] : no oral cyanosis [Normal Jugular Venous V Waves Present] : normal jugular venous V waves present [Respiration, Rhythm And Depth] : normal respiratory rhythm and effort [Exaggerated Use Of Accessory Muscles For Inspiration] : no accessory muscle use [Heart Rate And Rhythm] : heart rate and rhythm were normal [Heart Sounds] : normal S1 and S2 [Arterial Pulses Normal] : the arterial pulses were normal [Abdomen Soft] : soft [Abnormal Walk] : normal gait [Gait - Sufficient For Exercise Testing] : the gait was sufficient for exercise testing [Nail Clubbing] : no clubbing of the fingernails [Cyanosis, Localized] : no localized cyanosis [Skin Color & Pigmentation] : normal skin color and pigmentation [No Venous Stasis] : no venous stasis [] : no rash [Skin Lesions] : no skin lesions [No Skin Ulcers] : no skin ulcer [No Xanthoma] : no  xanthoma was observed [Oriented To Time, Place, And Person] : oriented to person, place, and time [Mood] : the mood was normal [Affect] : the affect was normal [No Anxiety] : not feeling anxious [Edema] : no peripheral edema present [Veins - Varicosity Changes] : no varicosital changes were noted in the lower extremities [FreeTextEntry1] : No significant murmur gallop or rub\par Trace ankle edema, \par Varicose veins

## 2020-07-28 NOTE — ASSESSMENT
[FreeTextEntry1] : EKG Elana February 27, 2019. Normal sinus rhythm. . Infero-anterolateral ST-T wave changes nonspecific. Rightward axis.\par \par Reviewed on April 1, 2019\par Echocardiogram. Preserved ejection fraction. Possible PFO/ASD.\par Myocardial perfusion scan pharmacological. Nonischemic with preserved ejection fraction\par \par Reviewed on July 22, 2019\par Transesophageal echocardiogram June 25, 2019. Ejection fraction 65%. Intra-atrial septum aneurysmal. PFO/ASD noted 0.3-0.4 cm. Significant passage of bubbles from right to left. Mild tricuspid regurgitation.\par Lipid panel with ASCVD, risk, less than 2% over 10 years. Reviewed.\par \par Reviewed on September 30, 2019\par Exercise treadmill stress tests was nonischemic, but with evidence of desaturation, and shortness of breath.\par September 2019. Limited echocardiogram. LV ejection fraction 60-65%. Stable inter-atrial septal occluder device. Trace pericardial effusion.\par \par Reviewed on November 18, 2019\par Noncontrast MRI of the brain was negative\par Echocardiogram P. October 4 was 19 and ejection fraction 60%. Stable ASD repair.\par \par Reviewed on Maryan 15, 2020.\par EKG normal sinus rhythm.  Nonspecific ST-T changes.\par \par Reviewed on July 28, 2020.\par Echocardiogram and exercise treadmill stress test as noted above\par Labs which were done recently showed stable CBC CMP.  Normal BNP level.  Normal TSH.

## 2020-07-28 NOTE — DISCUSSION/SUMMARY
[FreeTextEntry1] : 59-year-old female, now comes in after a recent intra-atrial septal occlusion device placement \par MRI of the brain was negative. Echocardiogram showed stable ASD repair\par Now with exertional dyspnea though showing stable echocardiogram.  Evidence of possible deconditioning on exercise treadmill stress test without any significant high risk ischemia or cardiac arrhythmia.  Stable labs which includes normal BNP stable hemoglobin and BMP level.\par Stable vital signs noted.\par Increasing activity exercise has led to improved exercise tolerance.\par At present I have reviewed present findings with her.  No significant high risk findings to suggest her dyspnea on exertion which is significantly improved with increasing exercise compared to prior to surgery.\par Reviewed limitation of evaluation management at present.\par Discussed with her that if continued symptoms we will have to consider further evaluation management which may include coronary CTA/pulmonary function tests etc.\par She understands agrees with the management plan.\par She will continue with her present medications.\par She will continue to increase her activity and exercise tolerance.\par If any significant worsening she will contact us.\par \par Counseling regarding low saturated fat, salt and carbohydrate intake was reviewed. Active lifestyle and regular. Exercise along with weight management is advised.\par All the above were at length reviewed. Answered all the questions. Thank you very much for this kind referral. Please do not hesitate to give me a call for any question.\par Part of this transcription was done with voice recognition software and phonetically similar errors are common. I apologize for that. Please do not hesitate to call for any questions due to above.\par \par She will otherwise see me in 6 months.

## 2020-07-28 NOTE — REVIEW OF SYSTEMS
[Feeling Fatigued] : feeling fatigued [Shortness Of Breath] : shortness of breath [Dyspnea on exertion] : dyspnea during exertion [see HPI] : see HPI [Joint Stiffness] : joint stiffness [Negative] : Heme/Lymph [Fever] : no fever [Headache] : no headache [Recent Weight Gain (___ Lbs)] : no recent weight gain [Recent Weight Loss (___ Lbs)] : no recent weight loss [Chest  Pressure] : no chest pressure [Chest Pain] : no chest pain [Lower Ext Edema] : no extremity edema [Leg Claudication] : no intermittent leg claudication [Palpitations] : no palpitations [Wheezing] : no wheezing [Cough] : no cough [Coughing Up Blood] : no hemoptysis [Joint Pain] : joint pain [Joint Swelling] : no joint swelling [FreeTextEntry1] : snoring,

## 2020-07-28 NOTE — HISTORY OF PRESENT ILLNESS
[FreeTextEntry1] : \par She is being treated for generalized inflammation.\par She has no history of hypertension, diabetes mellitus, myocardial infarction, cerebrovascular accident, periparotid disease\par Her CRP level was significantly elevated. I do not have lipid panel\par Large  ASD status post ASD repair with percutaneous closure device September 2019

## 2020-08-21 NOTE — DISCHARGE NOTE NURSING/CASE MANAGEMENT/SOCIAL WORK - NSDCFUADDAPPT_GEN_ALL_CORE_FT
Health Maintenance Due   Topic Date Due   • DTaP/Tdap/Td Vaccine (1 - Tdap) 09/22/1977   • Shingles Vaccine (1 of 2) 09/22/2008       Patient is due for topics listed above, he wishes to proceed with Immunization(s) Dtap/Tdap/Td and Shingles, Patient to discuss vaccines as he thinks he had a TDAP 2 years ago.       none ELIE Morrison [place] 1-2 weeks  Daily aspirin 81 and plavix 75mg :Your prescription was sent to your pharmacy

## 2021-02-25 ENCOUNTER — NON-APPOINTMENT (OUTPATIENT)
Age: 61
End: 2021-02-25

## 2021-02-25 ENCOUNTER — APPOINTMENT (OUTPATIENT)
Dept: CARDIOLOGY | Facility: CLINIC | Age: 61
End: 2021-02-25
Payer: COMMERCIAL

## 2021-02-25 VITALS
BODY MASS INDEX: 31.83 KG/M2 | DIASTOLIC BLOOD PRESSURE: 70 MMHG | OXYGEN SATURATION: 99 % | TEMPERATURE: 97.3 F | HEIGHT: 68 IN | HEART RATE: 71 BPM | SYSTOLIC BLOOD PRESSURE: 100 MMHG | WEIGHT: 210 LBS

## 2021-02-25 PROCEDURE — 99072 ADDL SUPL MATRL&STAF TM PHE: CPT

## 2021-02-25 PROCEDURE — 99214 OFFICE O/P EST MOD 30 MIN: CPT

## 2021-02-25 PROCEDURE — 93000 ELECTROCARDIOGRAM COMPLETE: CPT

## 2021-02-25 RX ORDER — FOLIC ACID 1 MG/1
1 TABLET ORAL
Qty: 30 | Refills: 0 | Status: DISCONTINUED | COMMUNITY
Start: 2020-05-27 | End: 2021-02-25

## 2021-02-25 NOTE — HISTORY OF PRESENT ILLNESS
[FreeTextEntry1] : JAYSON HODGES is a 60 year old female with a past medical history of:\par Large  ASD status post ASD repair with percutaneous closure device September 2019\par generalized inflammation.\par Elevated CRP\par Mild PRINCE\par HLD\par Osteoarthritis\par \par Last seen by Dr. Umana 7/28/20. In the interim had meniscus repair. Reports a pulling sensation/pressure like "ton of bricks" on her chest that woke her up from sleeping. Lasted several huors. Radiated to her chest and left arm. Reports her left arm feels "weird" at times; possible weak. There is SOB with and without exertion. There is a decrease in exercise capacity. Denies palpitations, dizziness, lightheadedness, syncope, near syncope, PND, orthopnea, claudication, and edema. Never a smoker. Not on a formal exercise regimen.\par \par /70 on my exam.\par \par Testing:\par \par EKG 2/25/21: SR with 1st degree heart block, PRWP, nonspecific ST-T wave abnormalities, AK interval 222ms, QTc 406 ms\par \par Labs 7/15/20: WBC 6.6, Hgb 12.1, HCT 36.8, plt 346, K 5, Cr 0.88, AST 21, ALT 15, Trigs 101, HDL 67, , Chol 190, BNP 71.2, TSH 2.39.\par \par Nuclear stress test 3/26/19: Lexiscan. No evidence of ischemia by EKG. Small, mild defect in anteroapical wall that is dixed c/w breast attenuation artifact. The observed defects are no longer significant with prone imaging. EF 69%.\par \par ETT 7/23/20: Sanya Protocol 4 minutes and 40 seconds. 6 METs. SOB. Equivocal for exercise induced ischemia.\par \par Echo 7/14/20: EF 58%. Minimal MR. An atrial septal closure defice is seen on the interatrial septum and appears well-seated with no residual interatrial flow by color doppler. Normal wall motion. Compared with echo 10/4/19, no sig changes noted.

## 2021-02-25 NOTE — PHYSICAL EXAM
[General Appearance - Well Developed] : well developed [Normal Appearance] : normal appearance [Well Groomed] : well groomed [General Appearance - Well Nourished] : well nourished [No Deformities] : no deformities [General Appearance - In No Acute Distress] : no acute distress [Normal Conjunctiva] : the conjunctiva exhibited no abnormalities [Eyelids - No Xanthelasma] : the eyelids demonstrated no xanthelasmas [Normal Oral Mucosa] : normal oral mucosa [No Oral Pallor] : no oral pallor [No Oral Cyanosis] : no oral cyanosis [Normal Jugular Venous V Waves Present] : normal jugular venous V waves present [Respiration, Rhythm And Depth] : normal respiratory rhythm and effort [Exaggerated Use Of Accessory Muscles For Inspiration] : no accessory muscle use [Heart Rate And Rhythm] : heart rate and rhythm were normal [Heart Sounds] : normal S1 and S2 [Arterial Pulses Normal] : the arterial pulses were normal [Edema] : no peripheral edema present [Veins - Varicosity Changes] : no varicosital changes were noted in the lower extremities [Abdomen Soft] : soft [Abnormal Walk] : normal gait [Gait - Sufficient For Exercise Testing] : the gait was sufficient for exercise testing [Nail Clubbing] : no clubbing of the fingernails [Cyanosis, Localized] : no localized cyanosis [Skin Color & Pigmentation] : normal skin color and pigmentation [] : no rash [No Venous Stasis] : no venous stasis [Skin Lesions] : no skin lesions [No Skin Ulcers] : no skin ulcer [No Xanthoma] : no  xanthoma was observed [Oriented To Time, Place, And Person] : oriented to person, place, and time [Affect] : the affect was normal [Mood] : the mood was normal [No Anxiety] : not feeling anxious [FreeTextEntry1] : No significant murmur gallop or rub\par Trace ankle edema, \par Varicose veins

## 2021-02-25 NOTE — DISCUSSION/SUMMARY
[FreeTextEntry1] : JAYSON HODGES is a 60 year old F who presents today Feb 25, 2021 with the above history and the following active issues:\par \par CP/SOB: Obtain 2d echocardiogram to evaluate resting heart structure, valvular function, and LVEF. Note prior equivocal stress testing. Recommend CT Cors to r/o CAD. Patient has an allergy to shellfish and has been given premedication instructions. Prednisone and Benadryl sent to pharmacy. Consider PFTs.\par \par Left arm "weakness": Obtain 7 day Zio monitor to determine presence of arrhythmias or significant pauses. Obtain carotid ultrasound to evaluate for evidence of significant carotid atherosclerosis or obstruction. If persists, advise f/u with neurologist.\par \par \par Intra-atrial septal occlusion device placement maintained on ASA.\par \par Fasting b/w has been ordered.\par \par PRINCE/elevated CRP: Advised to f/u with PCP.\par \par F/U after testing to review results (echo, carotid, Zio, CTA, labs).\par Discussed red flag symptoms, which would warrant sooner or emergent medical evaluation.\par Any questions and concerns were addressed and resolved.\par \par Sincerely,\par Anca Carrillo FNP-BC\par Patient's history, testing, and plan was reviewed with supervising physician, Dr. Jovany Garza

## 2021-02-25 NOTE — REASON FOR VISIT
[Follow-Up - Clinic] : a clinic follow-up of [Chest Pain] : chest pain [Dyspnea] : dyspnea [Fatigue] : feeling tired (fatigue) [Palpitations] : palpitations

## 2021-02-25 NOTE — REVIEW OF SYSTEMS
[Shortness Of Breath] : shortness of breath [Dyspnea on exertion] : dyspnea during exertion [see HPI] : see HPI [Joint Stiffness] : joint stiffness [Negative] : Heme/Lymph [Fever] : no fever [Headache] : no headache [Recent Weight Gain (___ Lbs)] : no recent weight gain [Feeling Fatigued] : not feeling fatigued [Recent Weight Loss (___ Lbs)] : no recent weight loss [Chest  Pressure] : no chest pressure [Chest Pain] : chest pain [Lower Ext Edema] : no extremity edema [Leg Claudication] : no intermittent leg claudication [Palpitations] : no palpitations [Cough] : no cough [Wheezing] : no wheezing [Coughing Up Blood] : no hemoptysis [Joint Pain] : no joint pain [Joint Swelling] : no joint swelling [FreeTextEntry1] : left arm weakness

## 2021-03-10 ENCOUNTER — APPOINTMENT (OUTPATIENT)
Dept: CARDIOLOGY | Facility: CLINIC | Age: 61
End: 2021-03-10

## 2021-03-22 ENCOUNTER — NON-APPOINTMENT (OUTPATIENT)
Age: 61
End: 2021-03-22

## 2021-03-24 ENCOUNTER — TRANSCRIPTION ENCOUNTER (OUTPATIENT)
Age: 61
End: 2021-03-24

## 2021-04-02 ENCOUNTER — APPOINTMENT (OUTPATIENT)
Dept: CARDIOLOGY | Facility: CLINIC | Age: 61
End: 2021-04-02
Payer: COMMERCIAL

## 2021-04-02 PROCEDURE — 93306 TTE W/DOPPLER COMPLETE: CPT

## 2021-04-02 PROCEDURE — 99072 ADDL SUPL MATRL&STAF TM PHE: CPT

## 2021-04-02 PROCEDURE — 93880 EXTRACRANIAL BILAT STUDY: CPT

## 2021-04-23 ENCOUNTER — APPOINTMENT (OUTPATIENT)
Dept: CARDIOLOGY | Facility: CLINIC | Age: 61
End: 2021-04-23

## 2021-04-23 ENCOUNTER — NON-APPOINTMENT (OUTPATIENT)
Age: 61
End: 2021-04-23

## 2021-04-23 ENCOUNTER — APPOINTMENT (OUTPATIENT)
Dept: CARDIOLOGY | Facility: CLINIC | Age: 61
End: 2021-04-23
Payer: COMMERCIAL

## 2021-04-23 VITALS
WEIGHT: 203 LBS | OXYGEN SATURATION: 98 % | HEART RATE: 83 BPM | DIASTOLIC BLOOD PRESSURE: 70 MMHG | BODY MASS INDEX: 30.77 KG/M2 | TEMPERATURE: 98 F | HEIGHT: 68 IN | SYSTOLIC BLOOD PRESSURE: 94 MMHG

## 2021-04-23 DIAGNOSIS — R93.1 ABNORMAL FINDINGS ON DIAGNOSTIC IMAGING OF HEART AND CORONARY CIRCULATION: ICD-10-CM

## 2021-04-23 DIAGNOSIS — R53.83 OTHER FATIGUE: ICD-10-CM

## 2021-04-23 DIAGNOSIS — G47.30 SLEEP APNEA, UNSPECIFIED: ICD-10-CM

## 2021-04-23 DIAGNOSIS — Z86.79 PERSONAL HISTORY OF OTHER DISEASES OF THE CIRCULATORY SYSTEM: ICD-10-CM

## 2021-04-23 DIAGNOSIS — Z87.898 PERSONAL HISTORY OF OTHER SPECIFIED CONDITIONS: ICD-10-CM

## 2021-04-23 PROCEDURE — 99214 OFFICE O/P EST MOD 30 MIN: CPT

## 2021-04-23 PROCEDURE — 99072 ADDL SUPL MATRL&STAF TM PHE: CPT

## 2021-04-23 NOTE — REVIEW OF SYSTEMS
[Shortness Of Breath] : shortness of breath [Dyspnea on exertion] : dyspnea during exertion [Chest Pain] : chest pain [see HPI] : see HPI [Joint Stiffness] : joint stiffness [Negative] : Heme/Lymph [Fever] : no fever [Headache] : no headache [Recent Weight Gain (___ Lbs)] : no recent weight gain [Feeling Fatigued] : not feeling fatigued [Recent Weight Loss (___ Lbs)] : no recent weight loss [Chest  Pressure] : no chest pressure [Lower Ext Edema] : no extremity edema [Leg Claudication] : no intermittent leg claudication [Palpitations] : no palpitations [Cough] : no cough [Wheezing] : no wheezing [Coughing Up Blood] : no hemoptysis [Joint Pain] : no joint pain [Joint Swelling] : no joint swelling [FreeTextEntry1] : left arm weakness

## 2021-04-23 NOTE — ASSESSMENT
[FreeTextEntry1] : Previously reviewed:\par \par Reviewed on July 22, 2019\par Transesophageal echocardiogram June 25, 2019. Ejection fraction 65%. Intra-atrial septum aneurysmal. PFO/ASD noted 0.3-0.4 cm. Significant passage of bubbles from right to left. Mild tricuspid regurgitation.\par Lipid panel with ASCVD, risk, less than 2% over 10 years. Reviewed.\par \par Reviewed on September 30, 2019\par Exercise treadmill stress tests was nonischemic, but with evidence of desaturation, and shortness of breath.\par September 2019. Limited echocardiogram. LV ejection fraction 60-65%. Stable inter-atrial septal occluder device. Trace pericardial effusion.\par \par Reviewed on November 18, 2019\par Noncontrast MRI of the brain was negative\par Echocardiogram P. October 4 was 19 and ejection fraction 60%. Stable ASD repair.\par \par Reviewed on Maryan 15, 2020.\par EKG normal sinus rhythm.  Nonspecific ST-T changes.\par \par Reviewed today:\par -Echocardiogram and exercise treadmill stress test as noted above\par -Carotid ultrasound April 2021: Atherosclerosis without stenosis\par -Echocardiogram April 2021: Normal LV function and wall motion.  Normal diastolic function. Normal PASP.\par -CTA of coronaries showed 0 calcium score and no significant CAD.  Tricuspid aortic valve.  EF 55.  Atrial septal occluder device stable in position

## 2021-04-23 NOTE — HISTORY OF PRESENT ILLNESS
[FreeTextEntry1] : JAYSON HODGES is a 60 year old female with a past medical history of:\par Large  ASD status post ASD repair with percutaneous closure device September 2019\par generalized inflammation.\par Mildly elevated HS-CRP\par Mild PRINCE\par HLD\par Osteoarthritis\par \par She has several symptoms including aches, pains, fatigue and generalized weakness.  The chest pain has resolved.  In the past month, she has started exercising, changed her diet and already lost more than 10 pounds. \par \par Denies palpitations, dizziness, lightheadedness, syncope, near syncope, PND, orthopnea, claudication, and edema. Never a smoker. Not on a formal exercise regimen.\par \par \par \par Previous testing:\par \par EKG 2/25/21: SR with 1st degree heart block, PRWP, nonspecific ST-T wave abnormalities, VA interval 222ms, QTc 406 ms\par \par Labs 7/15/20: WBC 6.6, Hgb 12.1, HCT 36.8, plt 346, K 5, Cr 0.88, AST 21, ALT 15, Trigs 101, HDL 67, , Chol 190, BNP 71.2, TSH 2.39.\par \par Nuclear stress test 3/26/19: Lexiscan. No evidence of ischemia by EKG. Small, mild defect in anteroapical wall that is dixed c/w breast attenuation artifact. The observed defects are no longer significant with prone imaging. EF 69%.\par \par ETT 7/23/20: Sanya Protocol 4 minutes and 40 seconds. 6 METs. SOB. Equivocal for exercise induced ischemia.\par \par Echo 7/14/20: EF 58%. Minimal MR. An atrial septal closure defice is seen on the interatrial septum and appears well-seated with no residual interatrial flow by color doppler. Normal wall motion. Compared with echo 10/4/19, no sig changes noted.

## 2021-04-23 NOTE — DISCUSSION/SUMMARY
[FreeTextEntry1] : JAYSON HODGES is a 60 year old F :\par \par Chest pain is resolved.  She has generalized aches and pains in the body which are also improving.  Shortness of breath is also improved.  She is exercising, dieting and has lost weight in the past month and feeling better.  I have encouraged her to continue with to do this.\par \par No focal weakness.  She denies ever having any focal weakness.  She admits to just have generalized fatigue and weakness.  She wants to defer Zio recording for now.\par \par Intra-atrial septal occlusion device placement maintained on ASA.\par \par Fasting b/w has been ordered.\par \par PRINCE/elevated CRP: Advised to f/u with PCP.\par \par Continue current medications\par \par Follow-up in 6 months.  She will call for the results of event recorder.

## 2021-04-30 ENCOUNTER — APPOINTMENT (OUTPATIENT)
Dept: CARDIOLOGY | Facility: CLINIC | Age: 61
End: 2021-04-30

## 2021-12-27 ENCOUNTER — APPOINTMENT (OUTPATIENT)
Dept: CARDIOLOGY | Facility: CLINIC | Age: 61
End: 2021-12-27

## 2022-01-05 ENCOUNTER — NON-APPOINTMENT (OUTPATIENT)
Age: 62
End: 2022-01-05

## 2022-01-05 ENCOUNTER — APPOINTMENT (OUTPATIENT)
Dept: CARDIOLOGY | Facility: CLINIC | Age: 62
End: 2022-01-05
Payer: COMMERCIAL

## 2022-01-05 VITALS
HEART RATE: 76 BPM | SYSTOLIC BLOOD PRESSURE: 110 MMHG | BODY MASS INDEX: 26.22 KG/M2 | WEIGHT: 173 LBS | OXYGEN SATURATION: 98 % | HEIGHT: 68 IN | DIASTOLIC BLOOD PRESSURE: 68 MMHG

## 2022-01-05 DIAGNOSIS — R06.00 DYSPNEA, UNSPECIFIED: ICD-10-CM

## 2022-01-05 PROCEDURE — 93000 ELECTROCARDIOGRAM COMPLETE: CPT

## 2022-01-05 PROCEDURE — 99214 OFFICE O/P EST MOD 30 MIN: CPT

## 2022-01-05 RX ORDER — AMOXICILLIN 500 MG/1
500 TABLET, FILM COATED ORAL
Qty: 4 | Refills: 5 | Status: DISCONTINUED | COMMUNITY
Start: 2019-12-13 | End: 2022-01-05

## 2022-01-05 RX ORDER — CAMPHOR 0.45 %
25 GEL (GRAM) TOPICAL
Qty: 2 | Refills: 0 | Status: DISCONTINUED | COMMUNITY
Start: 2021-02-25 | End: 2022-01-05

## 2022-01-05 RX ORDER — PREDNISONE 50 MG/1
50 TABLET ORAL
Qty: 3 | Refills: 0 | Status: DISCONTINUED | COMMUNITY
Start: 2021-02-25 | End: 2022-01-05

## 2022-01-05 NOTE — DISCUSSION/SUMMARY
[FreeTextEntry1] : 61-year-old female with above medical history and active medical problems as noted below\par 1.  Atypical chest pain.  Probably noncardiac.  Considering ASD closure device and her EKG which is abnormal with nonspecific T wave changes I have recommended her\par Echocardiogram\par Exercise treadmill stress test when able to do it\par If any worsening to contact us immediately\par Gastroesophageal reflux disease precautions\par 2.  ASD closure.  Echocardiogram for follow-up.\par 3.  Abnormal EKG.  Nonspecific T wave changes.  Evaluation as discussed above.  Overall no significant new change compared to EKG from before.\par 4.  Lifestyle modifications.  Follow-up on lipids.  ASCVD risk calculation.\par Preventive care with primary care physician's office\par \par Counseling regarding low saturated fat, salt and carbohydrate intake was reviewed. Active lifestyle and regular. Exercise along with weight management is advised.\par All the above were at length reviewed. Answered all the questions. Thank you very much for this kind referral. Please do not hesitate to give me a call for any question.\par Part of this transcription was done with voice recognition software and phonetically similar errors are common. I apologize for that. Please do not hesitate to call for any questions due to above.\par \par Follow-up to review above test on the phone

## 2022-01-05 NOTE — CARDIOLOGY SUMMARY
[___] : [unfilled] [LVEF ___%] : LVEF [unfilled]% [None] : no pulmonary hypertension [Normal] : normal LA size [Mild] : mild mitral regurgitation [de-identified] : EKG 2/25/21: SR with 1st degree heart block, PRWP, nonspecific ST-T wave abnormalities, SC interval 222ms, QTc 406 ms

## 2022-01-05 NOTE — HISTORY OF PRESENT ILLNESS
[FreeTextEntry1] : PMH\par She is being treated for generalized inflammation.\par She has no history of hypertension, diabetes mellitus, myocardial infarction, cerebrovascular accident, periparotid disease\par Her CRP level was significantly elevated. I do not have lipid panel\par Large  ASD status post ASD repair with percutaneous closure device September 2019\par Osteoarthritis\par Mild PRINCE

## 2022-01-05 NOTE — REASON FOR VISIT
[Symptom and Test Evaluation] : symptom and test evaluation [FreeTextEntry1] : 61-year-old female comes in for follow-up consultation.  She is since last seen she had about 3 episodes when she woke up in the middle of the night.  With substernal pain.  Sharp in character.  Often lasting for an hour.  Resolving on its own.  No other associated shortness of breath burping arm pain jaw pain diaphoresis or palpitation.\par Her shortness of breath has improved significantly.  She has lost significant weight.  She has no PND orthopnea or pedal edema.\par She does do regular activity and exercise program.\par Her weight is stable\par She has no visual disturbances focal weakness\par She has no chest pain.\par She has no palpitation near syncopal or syncopal event.\par \par \par \par

## 2022-02-21 ENCOUNTER — APPOINTMENT (OUTPATIENT)
Dept: CARDIOLOGY | Facility: CLINIC | Age: 62
End: 2022-02-21
Payer: COMMERCIAL

## 2022-02-21 PROCEDURE — 93015 CV STRESS TEST SUPVJ I&R: CPT

## 2022-02-21 PROCEDURE — 93306 TTE W/DOPPLER COMPLETE: CPT

## 2022-03-02 ENCOUNTER — APPOINTMENT (OUTPATIENT)
Dept: CARDIOLOGY | Facility: CLINIC | Age: 62
End: 2022-03-02
Payer: COMMERCIAL

## 2022-03-02 DIAGNOSIS — R79.82 ELEVATED C-REACTIVE PROTEIN (CRP): ICD-10-CM

## 2022-03-02 PROCEDURE — 99214 OFFICE O/P EST MOD 30 MIN: CPT

## 2022-03-10 ENCOUNTER — TRANSCRIPTION ENCOUNTER (OUTPATIENT)
Age: 62
End: 2022-03-10

## 2022-03-30 NOTE — CARDIOLOGY SUMMARY
[___] : [unfilled] [LVEF ___%] : LVEF [unfilled]% [None] : no pulmonary hypertension [Normal] : normal LA size [Mild] : mild mitral regurgitation [de-identified] : EKG 2/25/21: SR with 1st degree heart block, PRWP, nonspecific ST-T wave abnormalities, NC interval 222ms, QTc 406 ms [de-identified] : ETT 2/21/22 - 6min 49sec of Sanya protocol. Nonspecific ST-T wave changes. \par Negative for exercise induced ischemia by EKG\par \par Nuclear stress test 3/2019 no ischemia or infarct [de-identified] : Echo 2/21/22 - EF 55-60%, mild MR [de-identified] : CTA 3/24/2021 - calcium score 0, No gross evidence of coronary atherosclerosis [de-identified] : Carotid US 4/2021 normal study

## 2022-03-30 NOTE — HISTORY OF PRESENT ILLNESS
[FreeTextEntry1] : JAYSON HODGES  is a 61 year F  who presents today Mar 01, 2022 in clinical follow-up.  Overall she has been feeling well. There has been no recent illness or hospital stay. Medications have remained unchanged. Asymptomatic from cardiovascular and arrhythmia standpoint. \par Active on a regular basis with routine exercise with no new exertional complaints. \par Continues to have atypical chest pain. Pain is described as a "throbbing" pain. Occurs in the night - waking her out of a sleep. It is intermittent and not occurring on a routine basis. No associating factors. \par \par Today she denies unusual shortness of breath, lightheadedness, dizziness, near syncope or syncope. \par \par There is no prior history of myocardial infarction, coronary revascularization, history of ischemic heart disease, or symptomatic congestive heart failure. There is no history of symptomatic arrhythmias including atrial fibrillation. \par \par \par PMH\par She is being treated for generalized inflammation.\par She has no history of hypertension, diabetes mellitus, myocardial infarction, cerebrovascular accident, periparotid disease\par Her CRP level was significantly elevated. I do not have lipid panel\par Large  ASD status post ASD repair with percutaneous closure device September 2019\par Osteoarthritis\par Mild PRINCE

## 2022-03-30 NOTE — DISCUSSION/SUMMARY
[FreeTextEntry1] : JAYSON HODGES  is a 61 year F  who presents today March 2, 2022 with the above history and the following active issues.\par \par 1.  History of atypical chest pain.  Probably noncardiac.  Considering ASD closure device and her EKG which is abnormal with nonspecific T wave changes. Echo and ETT reviewed. Echo with normal EF and mild MR. ETT with no evidence of exercise induced ischemia. \par Limitations of non-invasive testing reviewed.\par \par 2.  ASD closure.  Echocardiogram reviewed.\par \par 3.  Abnormal EKG.  Nonspecific T wave changes.  Evaluation as discussed above.  Overall no significant new change compared to EKG from before.\par \par 4.  Lifestyle modifications.  Follow-up on lipids.  Labs requested from PCP. ASCVD risk calculation.\par Preventive care with primary care physician's office\par \par Red flag symptoms which would warrant sooner emergent evaluation reviewed with the patient. \par Questions and concerns were addressed and answered. \par \par Sincerely,\par \par Radha Santoyo PA-C\par Patients history, testing and plan reviewed with supervising MD: Dr. Fernanda Umana

## 2022-03-30 NOTE — ADDENDUM
[FreeTextEntry1] : Preoperative status for upcoming colonoscopy \par At present, there are no active cardiac conditions. \par No recent unstable coronary syndromes, decompensated heart failure, severe valvular heart disease or significant dysrhythmias.  \par Baseline functional status is good with no new exertional complaints\par The clinical benefit of the proposed procedure outweighs the associated cardiovascular risk.  \par Risk not attenuated with further CV testing.  \par Prior testing as outlined above.\par Optimized from a cardiovascular perspective.\par \par STEVEN Santoyo PA-C\par Supervising MD: Dr. Umana

## 2022-05-05 ENCOUNTER — NON-APPOINTMENT (OUTPATIENT)
Age: 62
End: 2022-05-05

## 2022-05-28 ENCOUNTER — OUTPATIENT (OUTPATIENT)
Dept: OUTPATIENT SERVICES | Facility: HOSPITAL | Age: 62
LOS: 1 days | End: 2022-05-28
Payer: COMMERCIAL

## 2022-05-28 ENCOUNTER — APPOINTMENT (OUTPATIENT)
Dept: MAMMOGRAPHY | Facility: CLINIC | Age: 62
End: 2022-05-28
Payer: COMMERCIAL

## 2022-05-28 DIAGNOSIS — Z98.890 OTHER SPECIFIED POSTPROCEDURAL STATES: Chronic | ICD-10-CM

## 2022-05-28 DIAGNOSIS — Z12.31 ENCOUNTER FOR SCREENING MAMMOGRAM FOR MALIGNANT NEOPLASM OF BREAST: ICD-10-CM

## 2022-05-28 DIAGNOSIS — Z00.8 ENCOUNTER FOR OTHER GENERAL EXAMINATION: ICD-10-CM

## 2022-05-28 PROCEDURE — 77067 SCR MAMMO BI INCL CAD: CPT

## 2022-05-28 PROCEDURE — 77063 BREAST TOMOSYNTHESIS BI: CPT | Mod: 26

## 2022-05-28 PROCEDURE — 77067 SCR MAMMO BI INCL CAD: CPT | Mod: 26

## 2022-05-28 PROCEDURE — 77063 BREAST TOMOSYNTHESIS BI: CPT

## 2022-10-06 ENCOUNTER — APPOINTMENT (OUTPATIENT)
Dept: ORTHOPEDIC SURGERY | Facility: CLINIC | Age: 62
End: 2022-10-06

## 2022-11-04 ENCOUNTER — APPOINTMENT (OUTPATIENT)
Dept: CARDIOLOGY | Facility: CLINIC | Age: 62
End: 2022-11-04

## 2022-11-11 ENCOUNTER — APPOINTMENT (OUTPATIENT)
Dept: CARDIOLOGY | Facility: CLINIC | Age: 62
End: 2022-11-11

## 2022-11-11 VITALS
HEIGHT: 68 IN | SYSTOLIC BLOOD PRESSURE: 110 MMHG | HEART RATE: 71 BPM | WEIGHT: 171 LBS | BODY MASS INDEX: 25.91 KG/M2 | OXYGEN SATURATION: 100 % | DIASTOLIC BLOOD PRESSURE: 62 MMHG

## 2022-11-11 PROCEDURE — 99214 OFFICE O/P EST MOD 30 MIN: CPT

## 2022-11-11 RX ORDER — BUPROPION HYDROCHLORIDE 150 MG/1
150 TABLET, EXTENDED RELEASE ORAL
Qty: 30 | Refills: 0 | Status: ACTIVE | COMMUNITY
Start: 2022-10-17

## 2022-11-11 RX ORDER — FAMOTIDINE 40 MG/1
40 TABLET, FILM COATED ORAL
Qty: 90 | Refills: 0 | Status: ACTIVE | COMMUNITY
Start: 2021-11-08

## 2022-11-11 NOTE — ASSESSMENT
[FreeTextEntry1] : Prior tests for reference.\par EKG  February 27, 2019. Normal sinus rhythm. . Infero-anterolateral ST-T wave changes nonspecific. Rightward axis.\par \par Reviewed on April 1, 2019\par Echocardiogram. Preserved ejection fraction. Possible PFO/ASD.\par Myocardial perfusion scan pharmacological. Nonischemic with preserved ejection fraction\par \par Reviewed on July 22, 2019\par Transesophageal echocardiogram June 25, 2019. Ejection fraction 65%. Intra-atrial septum aneurysmal. PFO/ASD noted 0.3-0.4 cm. Significant passage of bubbles from right to left. Mild tricuspid regurgitation.\par Lipid panel with ASCVD, risk, less than 2% over 10 years. Reviewed.\par \par Reviewed on September 30, 2019\par Exercise treadmill stress tests was nonischemic, but with evidence of desaturation, and shortness of breath.\par September 2019. Limited echocardiogram. LV ejection fraction 60-65%. Stable inter-atrial septal occluder device. Trace pericardial effusion.\par \par Reviewed on November 18, 2019\par Noncontrast MRI of the brain was negative\par Echocardiogram P. October 4 was 19 and ejection fraction 60%. Stable ASD repair.\par \par Reviewed on Maryan 15, 2020.\par EKG normal sinus rhythm.  Nonspecific ST-T changes.\par \par Reviewed on July 28, 2020.\par Echocardiogram and exercise treadmill stress test as noted above\par Labs which were done recently showed stable CBC CMP.  Normal BNP level.  Normal TSH.\par \par Reviewed on November 11, 2022.\par Labs from September 2022 reviewed.\par  HDL 70 triglycerides 71.\par Echocardiogram and exercise treadmill stress test were also reviewed which were done in February\par Coronary CTA also reviewed

## 2022-11-11 NOTE — REASON FOR VISIT
[Symptom and Test Evaluation] : symptom and test evaluation [FreeTextEntry3] : Dr. Lux [FreeTextEntry1] : 62-year-old female comes in for follow-up consultation.  She has lost significant weight due to dietary restrictions.  She has been feeling slightly fatigued.  But no chest pain.  She is here to review her labs.  And further management.\par Her shortness of breath has improved significantly.  She has lost significant weight.  She has no PND orthopnea or pedal edema.\par She does do regular activity and exercise program.\par Her weight is stable\par She has no visual disturbances focal weakness\par She has no chest pain.\par She has no palpitation near syncopal or syncopal event.\par \par \par \par

## 2022-11-11 NOTE — CARDIOLOGY SUMMARY
[___] : [unfilled] [LVEF ___%] : LVEF [unfilled]% [None] : no pulmonary hypertension [Normal] : normal LA size [Mild] : mild mitral regurgitation [de-identified] : EKG 2/25/21: SR with 1st degree heart block, PRWP, nonspecific ST-T wave abnormalities, LA interval 222ms, QTc 406 ms [de-identified] : Exercise treadmill stress test.  6 minutes 49 seconds.  8 METs.  No chest pain.  Nonischemic by EKG [de-identified] : February 21, 2022 EF 55 to 60% mild mitral regurgitation.  ASD closure device stable [de-identified] : 2021 coronary CTA.  Normal coronary arteries.  Aortic atherosclerosis

## 2022-11-11 NOTE — DISCUSSION/SUMMARY
[FreeTextEntry1] : 62-year-old female with above medical history and active medical problems as noted below\par 1.  Dyslipidemia.  Elevated LDL cholesterol..  Aortic atherosclerosis.  Statin therapy recommended.  Repeat LFT lipid panel CRP level if tolerated.  Risk benefits alternatives side effects reviewed.  Anti-inflammatory response of statin therapy also discussed.  Side effects reviewed.  She will contact us if there is any significant change.\par 2.  ASD closure.  Echocardiogram for follow-up.\par 3.  Abnormal EKG.  Nonspecific T wave changes.  Evaluation as discussed above.  Overall no significant new change compared to EKG from before.\par 4.  Lifestyle modifications.  Continue aggressive lifestyle and risk factor modifications\par Preventive care with primary care physician's office\par \par Counseling regarding low saturated fat, salt and carbohydrate intake was reviewed. Active lifestyle and regular. Exercise along with weight management is advised.\par All the above were at length reviewed. Answered all the questions. Thank you very much for this kind referral. Please do not hesitate to give me a call for any question.\par Part of this transcription was done with voice recognition software and phonetically similar errors are common. I apologize for that. Please do not hesitate to call for any questions due to above.\par \par Follow-up as planned

## 2022-11-24 ENCOUNTER — NON-APPOINTMENT (OUTPATIENT)
Age: 62
End: 2022-11-24

## 2023-03-20 ENCOUNTER — APPOINTMENT (OUTPATIENT)
Dept: CARDIOLOGY | Facility: CLINIC | Age: 63
End: 2023-03-20
Payer: COMMERCIAL

## 2023-03-20 VITALS
HEART RATE: 63 BPM | BODY MASS INDEX: 23.95 KG/M2 | DIASTOLIC BLOOD PRESSURE: 64 MMHG | SYSTOLIC BLOOD PRESSURE: 100 MMHG | OXYGEN SATURATION: 99 % | WEIGHT: 158 LBS | HEIGHT: 68 IN

## 2023-03-20 PROCEDURE — 99214 OFFICE O/P EST MOD 30 MIN: CPT

## 2023-03-20 PROCEDURE — 93306 TTE W/DOPPLER COMPLETE: CPT

## 2023-03-20 RX ORDER — ATORVASTATIN CALCIUM 20 MG/1
20 TABLET, FILM COATED ORAL DAILY
Qty: 90 | Refills: 3 | Status: DISCONTINUED | COMMUNITY
Start: 2022-11-11 | End: 2023-03-20

## 2023-03-20 NOTE — ASSESSMENT
[FreeTextEntry1] : Prior tests for reference.\par EKG  February 27, 2019. Normal sinus rhythm. . Infero-anterolateral ST-T wave changes nonspecific. Rightward axis.\par \par Reviewed on April 1, 2019\par Echocardiogram. Preserved ejection fraction. Possible PFO/ASD.\par Myocardial perfusion scan pharmacological. Nonischemic with preserved ejection fraction\par \par Reviewed on July 22, 2019\par Transesophageal echocardiogram June 25, 2019. Ejection fraction 65%. Intra-atrial septum aneurysmal. PFO/ASD noted 0.3-0.4 cm. Significant passage of bubbles from right to left. Mild tricuspid regurgitation.\par Lipid panel with ASCVD, risk, less than 2% over 10 years. Reviewed.\par \par Reviewed on September 30, 2019\par Exercise treadmill stress tests was nonischemic, but with evidence of desaturation, and shortness of breath.\par September 2019. Limited echocardiogram. LV ejection fraction 60-65%. Stable inter-atrial septal occluder device. Trace pericardial effusion.\par \par Reviewed on November 18, 2019\par Noncontrast MRI of the brain was negative\par Echocardiogram P. October 4 was 19 and ejection fraction 60%. Stable ASD repair.\par \par Reviewed on Maryan 15, 2020.\par EKG normal sinus rhythm.  Nonspecific ST-T changes.\par \par Reviewed on July 28, 2020.\par Echocardiogram and exercise treadmill stress test as noted above\par Labs which were done recently showed stable CBC CMP.  Normal BNP level.  Normal TSH.\par \par Reviewed on November 11, 2022.\par Labs from September 2022 reviewed.\par  HDL 70 triglycerides 71.\par Echocardiogram and exercise treadmill stress test were also reviewed which were done in February\par Coronary CTA also reviewed\par \par Reviewed on March 20, 2023.\par Echocardiogram as noted above\par Most recent labs showed stable CBC CMP from March 2023.\par CRP level was elevated.\par LDL 63 HDL 64 triglycerides 70

## 2023-03-20 NOTE — CARDIOLOGY SUMMARY
[___] : [unfilled] [LVEF ___%] : LVEF [unfilled]% [None] : no pulmonary hypertension [Normal] : normal LA size [Mild] : mild mitral regurgitation [de-identified] : EKG 2/25/21: SR with 1st degree heart block, PRWP, nonspecific ST-T wave abnormalities, [place] interval 222ms, QTc 406 ms [de-identified] : Exercise treadmill stress test.  6 minutes 49 seconds.  8 METs.  No chest pain.  Nonischemic by EKG [de-identified] : February 21, 2022 EF 55 to 60% mild mitral regurgitation.  ASD closure device stable\par March 20, 2023.  EF 55 to 60%.  Mild mitral regurgitation.  ASD closure device stable [de-identified] : 2021 coronary CTA.  Normal coronary arteries.  Aortic atherosclerosis

## 2023-03-20 NOTE — REVIEW OF SYSTEMS
[Negative] : Psychiatric [FreeTextEntry2] : As per HPI [FreeTextEntry5] : As per HPI [FreeTextEntry9] : As per HPI

## 2023-03-20 NOTE — REASON FOR VISIT
[Symptom and Test Evaluation] : symptom and test evaluation [FreeTextEntry3] : Dr. Lux [FreeTextEntry1] : 62-year-old female comes in for follow-up consultation to review labs and echocardiogram.\par .  She has lost significant weight due to dietary restrictions.  She has been feeling slightly fatigued.  But no chest pain.  \par Her shortness of breath has improved significantly.  She has lost significant weight.  She has no PND orthopnea or pedal edema.\par She does do regular activity and exercise program.\par Her weight is stable\par She has no visual disturbances focal weakness\par She has no chest pain.\par She has no palpitation near syncopal or syncopal event.\par \par \par \par

## 2023-03-20 NOTE — DISCUSSION/SUMMARY
[FreeTextEntry1] : 62-year-old female with above medical history and active medical problems as noted below\par 1.  Dyslipidemia.  Elevated LDL cholesterol..  Aortic atherosclerosis.  Statin therapy recommended to be continued.  Risk benefits alternatives side effects reviewed.\par Repeat labs in 6 months.\par 2.  ASD closure.  Nonrheumatic mitral regurgitation.  Stable findings reviewed.  Continue to follow\par 3.  Abnormal EKG.  Nonspecific T wave changes.  Stable echocardiogram and exercise treadmill stress test.  No clinical signs of unstable CAD.  Continue lifestyle and risk factor modifications.  Change in clinical status will require further evaluation management.\par 4.  Lifestyle modifications.  Continue aggressive lifestyle and risk factor modifications\par Preventive care with primary care physician's office\par \par Counseling regarding low saturated fat, salt and carbohydrate intake was reviewed. Active lifestyle and regular. Exercise along with weight management is advised.\par All the above were at length reviewed. Answered all the questions. Thank you very much for this kind referral. Please do not hesitate to give me a call for any question.\par Part of this transcription was done with voice recognition software and phonetically similar errors are common. I apologize for that. Please do not hesitate to call for any questions due to above.\par \par Sincerely,\par Fernanda Umana MD,FACC,RICARDA\par

## 2024-02-10 ENCOUNTER — NON-APPOINTMENT (OUTPATIENT)
Age: 64
End: 2024-02-10

## 2024-03-15 NOTE — REVIEW OF SYSTEMS
[Negative] : Heme/Lymph [FreeTextEntry2] : As per HPI [FreeTextEntry5] : As per HPI [FreeTextEntry9] : As per HPI

## 2024-03-15 NOTE — CARDIOLOGY SUMMARY
[de-identified] : EKG 2/25/21: SR with 1st degree heart block, PRWP, nonspecific ST-T wave abnormalities, [place] interval 222ms, QTc 406 ms [de-identified] : Exercise treadmill stress test.  6 minutes 49 seconds.  8 METs.  No chest pain.  Nonischemic by EKG [de-identified] : 2021 coronary CTA.  Normal coronary arteries.  Aortic atherosclerosis [de-identified] : February 21, 2022 EF 55 to 60% mild mitral regurgitation.  ASD closure device stable\par  March 20, 2023.  EF 55 to 60%.  Mild mitral regurgitation.  ASD closure device stable [___] : [unfilled] [LVEF ___%] : LVEF [unfilled]% [None] : no pulmonary hypertension [Normal] : normal LA size [Mild] : mild mitral regurgitation

## 2024-03-18 ENCOUNTER — APPOINTMENT (OUTPATIENT)
Dept: CARDIOLOGY | Facility: CLINIC | Age: 64
End: 2024-03-18
Payer: COMMERCIAL

## 2024-04-05 ENCOUNTER — APPOINTMENT (OUTPATIENT)
Dept: CARDIOLOGY | Facility: CLINIC | Age: 64
End: 2024-04-05
Payer: COMMERCIAL

## 2024-04-05 ENCOUNTER — NON-APPOINTMENT (OUTPATIENT)
Age: 64
End: 2024-04-05

## 2024-04-05 VITALS
BODY MASS INDEX: 21.67 KG/M2 | HEIGHT: 68 IN | DIASTOLIC BLOOD PRESSURE: 70 MMHG | SYSTOLIC BLOOD PRESSURE: 120 MMHG | WEIGHT: 143 LBS | OXYGEN SATURATION: 100 % | HEART RATE: 69 BPM

## 2024-04-05 DIAGNOSIS — I70.0 ATHEROSCLEROSIS OF AORTA: ICD-10-CM

## 2024-04-05 DIAGNOSIS — I34.0 NONRHEUMATIC MITRAL (VALVE) INSUFFICIENCY: ICD-10-CM

## 2024-04-05 DIAGNOSIS — R94.31 ABNORMAL ELECTROCARDIOGRAM [ECG] [EKG]: ICD-10-CM

## 2024-04-05 DIAGNOSIS — Z87.74 PERSONAL HISTORY OF (CORRECTED) CONGENITAL MALFORMATIONS OF HEART AND CIRCULATORY SYSTEM: ICD-10-CM

## 2024-04-05 DIAGNOSIS — R07.89 OTHER CHEST PAIN: ICD-10-CM

## 2024-04-05 DIAGNOSIS — Z86.39 PERSONAL HISTORY OF OTHER ENDOCRINE, NUTRITIONAL AND METABOLIC DISEASE: ICD-10-CM

## 2024-04-05 PROCEDURE — 99214 OFFICE O/P EST MOD 30 MIN: CPT | Mod: 25

## 2024-04-05 PROCEDURE — 93000 ELECTROCARDIOGRAM COMPLETE: CPT

## 2024-04-05 PROCEDURE — 93306 TTE W/DOPPLER COMPLETE: CPT

## 2024-04-05 PROCEDURE — G2211 COMPLEX E/M VISIT ADD ON: CPT | Mod: NC,1L

## 2024-04-05 PROCEDURE — 93000 ELECTROCARDIOGRAM COMPLETE: CPT | Mod: 59

## 2024-04-05 RX ORDER — ATORVASTATIN CALCIUM 20 MG/1
20 TABLET, FILM COATED ORAL
Qty: 90 | Refills: 3 | Status: DISCONTINUED | COMMUNITY
Start: 2023-03-20 | End: 2024-04-05

## 2024-04-05 RX ORDER — ATORVASTATIN CALCIUM 20 MG/1
20 TABLET, FILM COATED ORAL DAILY
Qty: 90 | Refills: 3 | Status: ACTIVE | COMMUNITY
Start: 2024-04-05 | End: 1900-01-01

## 2024-04-05 NOTE — PHYSICAL EXAM
[Normal] : clear lung fields, good air entry, no respiratory distress [Normal Speech] : normal speech [Alert and Oriented] : alert and oriented

## 2024-04-05 NOTE — ASSESSMENT
[FreeTextEntry1] : Reviewed on April 5, 2024 EKG and echocardiogram as noted above Labs which were done from November 29, 2023.  CMP stable.  TSH was normal.

## 2024-04-05 NOTE — DISCUSSION/SUMMARY
[FreeTextEntry1] : 63-year-old female with above medical history and active medical problems as noted below 1.  Dyslipidemia.  Elevated LDL cholesterol..  Aortic atherosclerosis.  Statin therapy recommended to be continued.  Risk benefits alternatives side effects reviewed.  Atorvastatin 20 mg started. Repeat labs in 6-8 weeks  2.  ASD closure.  Nonrheumatic mitral regurgitation.  Stable findings reviewed.  Continue to follow.  Aspirin 81 mg. 3.  Abnormal EKG.  Nonspecific T wave changes.  Stable echocardiogram . nonischemic  exercise treadmill stress test.  No clinical signs of unstable CAD.  Continue lifestyle and risk factor modifications.  Change in clinical status will require further evaluation management. 4.  Lifestyle modifications.  Continue aggressive lifestyle and risk factor modifications Preventive care with primary care physician's office  Counseling regarding low saturated fat, salt and carbohydrate intake was reviewed. Active lifestyle and regular. Exercise along with weight management is advised. All the above were at length reviewed. Answered all the questions. Thank you very much for this kind referral. Please do not hesitate to give me a call for any question. Part of this transcription was done with voice recognition software and phonetically similar errors are common. I apologize for that. Please do not hesitate to call for any questions due to above.  Sincerely, Fernanda Umana MD,FACC,RICARDA  [EKG obtained to assist in diagnosis and management of assessed problem(s)] : EKG obtained to assist in diagnosis and management of assessed problem(s)

## 2024-04-05 NOTE — REASON FOR VISIT
[Symptom and Test Evaluation] : symptom and test evaluation [FreeTextEntry3] : Dr. Lux [FreeTextEntry1] : 63-year-old female comes in for follow-up consultation to review labs and echocardiogram. She has lost significant weight due to dietary restrictions.  She has been feeling slightly fatigued.  But no chest pain.   Her shortness of breath has improved significantly.  She has lost significant weight.  She has no PND orthopnea or pedal edema. She does do regular activity and exercise program. Her weight is stable She has no visual disturbances focal weakness She has no chest pain. She has no palpitation near syncopal or syncopal event. She is not sure why she is not taking her atorvastatin. No recent hospital admission.

## 2024-04-05 NOTE — CARDIOLOGY SUMMARY
[___] : [unfilled] [LVEF ___%] : LVEF [unfilled]% [None] : no pulmonary hypertension [Normal] : normal LA size [Mild] : mild mitral regurgitation [de-identified] : EKG 2/25/21: SR with 1st degree heart block, PRWP, nonspecific ST-T wave abnormalities, [place] interval 222ms, QTc 406 ms April 5, 2024.  Normal sinus rhythm.  Nonspecific ST-T changes. [de-identified] : Exercise treadmill stress test.  6 minutes 49 seconds.  8 METs.  No chest pain.  Nonischemic by EKG [de-identified] : February 21, 2022 EF 55 to 60% mild mitral regurgitation.  ASD closure device stable March 20, 2023.  EF 55 to 60%.  Mild mitral regurgitation.  ASD closure device stable April 5, 2024.  Preserved EF.  65% mild MR TR WA.  ASD closure device stable.  [de-identified] : 2021 coronary CTA.  Normal coronary arteries.  Aortic atherosclerosis

## 2024-04-11 ENCOUNTER — APPOINTMENT (OUTPATIENT)
Dept: MAMMOGRAPHY | Facility: CLINIC | Age: 64
End: 2024-04-11
Payer: COMMERCIAL

## 2024-04-11 ENCOUNTER — APPOINTMENT (OUTPATIENT)
Dept: ULTRASOUND IMAGING | Facility: CLINIC | Age: 64
End: 2024-04-11

## 2024-04-11 PROCEDURE — 76882 US LMTD JT/FCL EVL NVASC XTR: CPT | Mod: LT

## 2024-04-11 PROCEDURE — 77066 DX MAMMO INCL CAD BI: CPT

## 2024-04-11 PROCEDURE — G0279: CPT

## 2024-10-17 ENCOUNTER — NON-APPOINTMENT (OUTPATIENT)
Age: 64
End: 2024-10-17

## 2025-05-23 ENCOUNTER — NON-APPOINTMENT (OUTPATIENT)
Age: 65
End: 2025-05-23

## 2025-06-17 ENCOUNTER — NON-APPOINTMENT (OUTPATIENT)
Age: 65
End: 2025-06-17

## 2025-07-10 ENCOUNTER — APPOINTMENT (OUTPATIENT)
Dept: CARDIOLOGY | Facility: CLINIC | Age: 65
End: 2025-07-10
Payer: COMMERCIAL

## 2025-07-10 ENCOUNTER — NON-APPOINTMENT (OUTPATIENT)
Age: 65
End: 2025-07-10

## 2025-07-10 VITALS
WEIGHT: 158 LBS | BODY MASS INDEX: 23.95 KG/M2 | OXYGEN SATURATION: 97 % | DIASTOLIC BLOOD PRESSURE: 78 MMHG | HEIGHT: 68 IN | HEART RATE: 78 BPM | SYSTOLIC BLOOD PRESSURE: 106 MMHG

## 2025-07-10 PROBLEM — C82.99: Status: ACTIVE | Noted: 2025-07-10

## 2025-07-10 PROCEDURE — G2211 COMPLEX E/M VISIT ADD ON: CPT | Mod: NC

## 2025-07-10 PROCEDURE — 99214 OFFICE O/P EST MOD 30 MIN: CPT

## 2025-07-10 PROCEDURE — 93000 ELECTROCARDIOGRAM COMPLETE: CPT

## 2025-07-10 RX ORDER — ATORVASTATIN CALCIUM 20 MG/1
20 TABLET, FILM COATED ORAL DAILY
Qty: 90 | Refills: 3 | Status: ACTIVE | COMMUNITY
Start: 2025-07-10 | End: 1900-01-01

## 2025-07-10 RX ORDER — SPIRONOLACTONE 100 MG/1
100 TABLET ORAL DAILY
Refills: 0 | Status: ACTIVE | COMMUNITY

## 2025-07-10 RX ORDER — PANTOPRAZOLE 40 MG/1
40 TABLET, DELAYED RELEASE ORAL DAILY
Refills: 0 | Status: ACTIVE | COMMUNITY

## 2025-07-10 RX ORDER — FOLIC ACID 1 MG/1
1 TABLET ORAL DAILY
Refills: 0 | Status: ACTIVE | COMMUNITY

## 2025-07-20 ENCOUNTER — NON-APPOINTMENT (OUTPATIENT)
Age: 65
End: 2025-07-20

## 2025-08-12 ENCOUNTER — NON-APPOINTMENT (OUTPATIENT)
Age: 65
End: 2025-08-12